# Patient Record
Sex: FEMALE | Race: WHITE | NOT HISPANIC OR LATINO | Employment: OTHER | URBAN - METROPOLITAN AREA
[De-identification: names, ages, dates, MRNs, and addresses within clinical notes are randomized per-mention and may not be internally consistent; named-entity substitution may affect disease eponyms.]

---

## 2022-01-01 ENCOUNTER — APPOINTMENT (INPATIENT)
Dept: CT IMAGING | Facility: HOSPITAL | Age: 87
End: 2022-01-01

## 2022-01-01 ENCOUNTER — APPOINTMENT (INPATIENT)
Dept: NON INVASIVE DIAGNOSTICS | Facility: HOSPITAL | Age: 87
End: 2022-01-01

## 2022-01-01 ENCOUNTER — APPOINTMENT (EMERGENCY)
Dept: RADIOLOGY | Facility: HOSPITAL | Age: 87
End: 2022-01-01

## 2022-01-01 ENCOUNTER — EPISODE CHANGES (OUTPATIENT)
Dept: CASE MANAGEMENT | Facility: OTHER | Age: 87
End: 2022-01-01

## 2022-01-01 ENCOUNTER — HOSPITAL ENCOUNTER (INPATIENT)
Facility: HOSPITAL | Age: 87
LOS: 2 days | End: 2022-12-01
Attending: EMERGENCY MEDICINE | Admitting: INTERNAL MEDICINE

## 2022-01-01 VITALS
BODY MASS INDEX: 33.31 KG/M2 | DIASTOLIC BLOOD PRESSURE: 60 MMHG | OXYGEN SATURATION: 94 % | SYSTOLIC BLOOD PRESSURE: 147 MMHG | RESPIRATION RATE: 20 BRPM | WEIGHT: 181 LBS | HEART RATE: 98 BPM | HEIGHT: 62 IN | TEMPERATURE: 97.3 F

## 2022-01-01 DIAGNOSIS — R77.8 ELEVATED TROPONIN: Primary | ICD-10-CM

## 2022-01-01 DIAGNOSIS — D72.829 LEUKOCYTOSIS: ICD-10-CM

## 2022-01-01 DIAGNOSIS — R74.8 ELEVATED CK: ICD-10-CM

## 2022-01-01 DIAGNOSIS — R63.0 LOSS OF APPETITE: ICD-10-CM

## 2022-01-01 DIAGNOSIS — D72.9 NEUTROPHILIC LEUKOCYTOSIS: ICD-10-CM

## 2022-01-01 DIAGNOSIS — R94.31 PROLONGED Q-T INTERVAL ON ECG: ICD-10-CM

## 2022-01-01 DIAGNOSIS — I10 HYPERTENSION: ICD-10-CM

## 2022-01-01 DIAGNOSIS — R53.1 WEAKNESS: ICD-10-CM

## 2022-01-01 LAB
2HR DELTA HS TROPONIN: 497 NG/L
4HR DELTA HS TROPONIN: 710 NG/L
ALBUMIN SERPL BCP-MCNC: 2.8 G/DL (ref 3.5–5)
ALP SERPL-CCNC: 100 U/L (ref 46–116)
ALT SERPL W P-5'-P-CCNC: 31 U/L (ref 12–78)
ANION GAP SERPL CALCULATED.3IONS-SCNC: 13 MMOL/L (ref 4–13)
ANION GAP SERPL CALCULATED.3IONS-SCNC: 14 MMOL/L (ref 4–13)
ANION GAP SERPL CALCULATED.3IONS-SCNC: 15 MMOL/L (ref 4–13)
ANION GAP SERPL CALCULATED.3IONS-SCNC: 15 MMOL/L (ref 4–13)
AORTIC ROOT: 3.1 CM
APICAL FOUR CHAMBER EJECTION FRACTION: 25 %
APTT PPP: 24 SECONDS (ref 23–37)
APTT PPP: 33 SECONDS (ref 23–37)
APTT PPP: 33 SECONDS (ref 23–37)
APTT PPP: 36 SECONDS (ref 23–37)
APTT PPP: 65 SECONDS (ref 23–37)
APTT PPP: 69 SECONDS (ref 23–37)
APTT PPP: 97 SECONDS (ref 23–37)
ASCENDING AORTA: 3.2 CM
AST SERPL W P-5'-P-CCNC: 53 U/L (ref 5–45)
ATRIAL RATE: 75 BPM
ATRIAL RATE: 76 BPM
ATRIAL RATE: 78 BPM
BACTERIA UR QL AUTO: ABNORMAL /HPF
BASOPHILS # BLD AUTO: 0.07 THOUSANDS/ÂΜL (ref 0–0.1)
BASOPHILS # BLD MANUAL: 0 THOUSAND/UL (ref 0–0.1)
BASOPHILS # BLD MANUAL: 0 THOUSAND/UL (ref 0–0.1)
BASOPHILS NFR BLD AUTO: 0 % (ref 0–1)
BASOPHILS NFR MAR MANUAL: 0 % (ref 0–1)
BASOPHILS NFR MAR MANUAL: 0 % (ref 0–1)
BILIRUB SERPL-MCNC: 1.49 MG/DL (ref 0.2–1)
BILIRUB UR QL STRIP: NEGATIVE
BUN SERPL-MCNC: 55 MG/DL (ref 5–25)
BUN SERPL-MCNC: 58 MG/DL (ref 5–25)
BUN SERPL-MCNC: 58 MG/DL (ref 5–25)
BUN SERPL-MCNC: 61 MG/DL (ref 5–25)
BURR CELLS BLD QL SMEAR: PRESENT
BURR CELLS BLD QL SMEAR: PRESENT
CALCIUM ALBUM COR SERPL-MCNC: 9.8 MG/DL (ref 8.3–10.1)
CALCIUM SERPL-MCNC: 8 MG/DL (ref 8.3–10.1)
CALCIUM SERPL-MCNC: 8.3 MG/DL (ref 8.3–10.1)
CALCIUM SERPL-MCNC: 8.6 MG/DL (ref 8.3–10.1)
CALCIUM SERPL-MCNC: 8.8 MG/DL (ref 8.3–10.1)
CARDIAC TROPONIN I PNL SERPL HS: 1224 NG/L
CARDIAC TROPONIN I PNL SERPL HS: 1437 NG/L
CARDIAC TROPONIN I PNL SERPL HS: 727 NG/L
CARDIAC TROPONIN I PNL SERPL HS: 802 NG/L (ref 8–18)
CHLORIDE SERPL-SCNC: 101 MMOL/L (ref 96–108)
CHLORIDE SERPL-SCNC: 103 MMOL/L (ref 96–108)
CHLORIDE SERPL-SCNC: 108 MMOL/L (ref 96–108)
CHLORIDE SERPL-SCNC: 97 MMOL/L (ref 96–108)
CHOLEST SERPL-MCNC: 119 MG/DL
CLARITY UR: ABNORMAL
CO2 SERPL-SCNC: 20 MMOL/L (ref 21–32)
CO2 SERPL-SCNC: 20 MMOL/L (ref 21–32)
CO2 SERPL-SCNC: 23 MMOL/L (ref 21–32)
CO2 SERPL-SCNC: 24 MMOL/L (ref 21–32)
COLOR UR: YELLOW
CREAT SERPL-MCNC: 1.62 MG/DL (ref 0.6–1.3)
CREAT SERPL-MCNC: 1.71 MG/DL (ref 0.6–1.3)
CREAT SERPL-MCNC: 1.82 MG/DL (ref 0.6–1.3)
CREAT SERPL-MCNC: 1.99 MG/DL (ref 0.6–1.3)
E WAVE DECELERATION TIME: 186 MS
EOSINOPHIL # BLD AUTO: 0 THOUSAND/ÂΜL (ref 0–0.61)
EOSINOPHIL # BLD MANUAL: 0 THOUSAND/UL (ref 0–0.4)
EOSINOPHIL # BLD MANUAL: 0 THOUSAND/UL (ref 0–0.4)
EOSINOPHIL NFR BLD AUTO: 0 % (ref 0–6)
EOSINOPHIL NFR BLD MANUAL: 0 % (ref 0–6)
EOSINOPHIL NFR BLD MANUAL: 0 % (ref 0–6)
ERYTHROCYTE [DISTWIDTH] IN BLOOD BY AUTOMATED COUNT: 13.9 % (ref 11.6–15.1)
ERYTHROCYTE [DISTWIDTH] IN BLOOD BY AUTOMATED COUNT: 14 % (ref 11.6–15.1)
ERYTHROCYTE [DISTWIDTH] IN BLOOD BY AUTOMATED COUNT: 14.2 % (ref 11.6–15.1)
ERYTHROCYTE [DISTWIDTH] IN BLOOD BY AUTOMATED COUNT: 14.3 % (ref 11.6–15.1)
ERYTHROCYTE [DISTWIDTH] IN BLOOD BY AUTOMATED COUNT: 14.5 % (ref 11.6–15.1)
ERYTHROCYTE [DISTWIDTH] IN BLOOD BY AUTOMATED COUNT: 14.6 % (ref 11.6–15.1)
FINE GRAN CASTS URNS QL MICRO: ABNORMAL /LPF
FLUAV RNA RESP QL NAA+PROBE: NEGATIVE
FLUBV RNA RESP QL NAA+PROBE: NEGATIVE
FRACTIONAL SHORTENING: 24 (ref 28–44)
GFR SERPL CREATININE-BSD FRML MDRD: 21 ML/MIN/1.73SQ M
GFR SERPL CREATININE-BSD FRML MDRD: 24 ML/MIN/1.73SQ M
GFR SERPL CREATININE-BSD FRML MDRD: 25 ML/MIN/1.73SQ M
GFR SERPL CREATININE-BSD FRML MDRD: 27 ML/MIN/1.73SQ M
GLUCOSE P FAST SERPL-MCNC: 231 MG/DL (ref 65–99)
GLUCOSE SERPL-MCNC: 231 MG/DL (ref 65–140)
GLUCOSE SERPL-MCNC: 260 MG/DL (ref 65–140)
GLUCOSE SERPL-MCNC: 260 MG/DL (ref 65–140)
GLUCOSE SERPL-MCNC: 267 MG/DL (ref 65–140)
GLUCOSE SERPL-MCNC: 313 MG/DL (ref 65–140)
GLUCOSE UR STRIP-MCNC: NEGATIVE MG/DL
HCT VFR BLD AUTO: 39.2 % (ref 34.8–46.1)
HCT VFR BLD AUTO: 41 % (ref 34.8–46.1)
HCT VFR BLD AUTO: 41 % (ref 34.8–46.1)
HCT VFR BLD AUTO: 41.2 % (ref 34.8–46.1)
HCT VFR BLD AUTO: 43.2 % (ref 34.8–46.1)
HCT VFR BLD AUTO: 44 % (ref 34.8–46.1)
HDLC SERPL-MCNC: 29 MG/DL
HGB BLD-MCNC: 13.6 G/DL (ref 11.5–15.4)
HGB BLD-MCNC: 13.7 G/DL (ref 11.5–15.4)
HGB BLD-MCNC: 13.7 G/DL (ref 11.5–15.4)
HGB BLD-MCNC: 13.9 G/DL (ref 11.5–15.4)
HGB BLD-MCNC: 14.5 G/DL (ref 11.5–15.4)
HGB BLD-MCNC: 14.7 G/DL (ref 11.5–15.4)
HGB UR QL STRIP.AUTO: ABNORMAL
IMM GRANULOCYTES # BLD AUTO: 0.26 THOUSAND/UL (ref 0–0.2)
IMM GRANULOCYTES NFR BLD AUTO: 1 % (ref 0–2)
INR PPP: 1.33 (ref 0.84–1.19)
INR PPP: 1.48 (ref 0.84–1.19)
INTERVENTRICULAR SEPTUM IN DIASTOLE (PARASTERNAL SHORT AXIS VIEW): 1.2 CM
INTERVENTRICULAR SEPTUM: 1.2 CM (ref 0.6–1.1)
KETONES UR STRIP-MCNC: ABNORMAL MG/DL
LAAS-AP2: 23.7 CM2
LAAS-AP4: 24.1 CM2
LDLC SERPL CALC-MCNC: 63 MG/DL (ref 0–100)
LEFT ATRIUM SIZE: 4.9 CM
LEFT INTERNAL DIMENSION IN SYSTOLE: 3.8 CM (ref 2.1–4)
LEFT VENTRICLE DIASTOLIC VOLUME (MOD BIPLANE): 82 ML
LEFT VENTRICLE SYSTOLIC VOLUME (MOD BIPLANE): 65 ML
LEFT VENTRICULAR INTERNAL DIMENSION IN DIASTOLE: 5 CM (ref 3.5–6)
LEFT VENTRICULAR POSTERIOR WALL IN END DIASTOLE: 1.1 CM
LEFT VENTRICULAR STROKE VOLUME: 59 ML
LEUKOCYTE ESTERASE UR QL STRIP: NEGATIVE
LV EF: 21 %
LVSV (TEICH): 59 ML
LYMPHOCYTES # BLD AUTO: 0.32 THOUSAND/UL (ref 0.6–4.47)
LYMPHOCYTES # BLD AUTO: 0.53 THOUSANDS/ÂΜL (ref 0.6–4.47)
LYMPHOCYTES # BLD AUTO: 1 % (ref 14–44)
LYMPHOCYTES # BLD AUTO: 1.14 THOUSAND/UL (ref 0.6–4.47)
LYMPHOCYTES # BLD AUTO: 4 % (ref 14–44)
LYMPHOCYTES NFR BLD AUTO: 2 % (ref 14–44)
MAGNESIUM SERPL-MCNC: 1.4 MG/DL (ref 1.6–2.6)
MAGNESIUM SERPL-MCNC: 1.7 MG/DL (ref 1.6–2.6)
MCH RBC QN AUTO: 30.9 PG (ref 26.8–34.3)
MCH RBC QN AUTO: 31 PG (ref 26.8–34.3)
MCH RBC QN AUTO: 31.1 PG (ref 26.8–34.3)
MCH RBC QN AUTO: 31.2 PG (ref 26.8–34.3)
MCH RBC QN AUTO: 31.5 PG (ref 26.8–34.3)
MCH RBC QN AUTO: 31.7 PG (ref 26.8–34.3)
MCHC RBC AUTO-ENTMCNC: 33.4 G/DL (ref 31.4–37.4)
MCHC RBC AUTO-ENTMCNC: 33.6 G/DL (ref 31.4–37.4)
MCHC RBC AUTO-ENTMCNC: 33.7 G/DL (ref 31.4–37.4)
MCHC RBC AUTO-ENTMCNC: 34.7 G/DL (ref 31.4–37.4)
MCV RBC AUTO: 91 FL (ref 82–98)
MCV RBC AUTO: 92 FL (ref 82–98)
MCV RBC AUTO: 93 FL (ref 82–98)
MONOCYTES # BLD AUTO: 1.14 THOUSAND/UL (ref 0–1.22)
MONOCYTES # BLD AUTO: 2.04 THOUSAND/ÂΜL (ref 0.17–1.22)
MONOCYTES # BLD AUTO: 2.23 THOUSAND/UL (ref 0–1.22)
MONOCYTES NFR BLD AUTO: 7 % (ref 4–12)
MONOCYTES NFR BLD: 4 % (ref 4–12)
MONOCYTES NFR BLD: 7 % (ref 4–12)
MRSA NOSE QL CULT: NORMAL
MV E'TISSUE VEL-SEP: 3 CM/S
MV PEAK A VEL: 1.02 M/S
MV PEAK E VEL: 94 CM/S
MV STENOSIS PRESSURE HALF TIME: 54 MS
MV VALVE AREA P 1/2 METHOD: 4.07
NEUTROPHILS # BLD AUTO: 25.99 THOUSANDS/ÂΜL (ref 1.85–7.62)
NEUTROPHILS # BLD MANUAL: 26.21 THOUSAND/UL (ref 1.85–7.62)
NEUTROPHILS # BLD MANUAL: 29.28 THOUSAND/UL (ref 1.85–7.62)
NEUTS BAND NFR BLD MANUAL: 23 % (ref 0–8)
NEUTS BAND NFR BLD MANUAL: 6 % (ref 0–8)
NEUTS SEG NFR BLD AUTO: 69 % (ref 43–75)
NEUTS SEG NFR BLD AUTO: 86 % (ref 43–75)
NEUTS SEG NFR BLD AUTO: 90 % (ref 43–75)
NITRITE UR QL STRIP: POSITIVE
NON-SQ EPI CELLS URNS QL MICRO: ABNORMAL /HPF
NONHDLC SERPL-MCNC: 90 MG/DL
NRBC BLD AUTO-RTO: 0 /100 WBCS
NT-PROBNP SERPL-MCNC: ABNORMAL PG/ML
P AXIS: 29 DEGREES
P AXIS: 43 DEGREES
P AXIS: 85 DEGREES
PH UR STRIP.AUTO: 6 [PH]
PLATELET # BLD AUTO: 193 THOUSANDS/UL (ref 149–390)
PLATELET # BLD AUTO: 218 THOUSANDS/UL (ref 149–390)
PLATELET # BLD AUTO: 226 THOUSANDS/UL (ref 149–390)
PLATELET # BLD AUTO: 233 THOUSANDS/UL (ref 149–390)
PLATELET # BLD AUTO: 246 THOUSANDS/UL (ref 149–390)
PLATELET # BLD AUTO: 262 THOUSANDS/UL (ref 149–390)
PLATELET BLD QL SMEAR: ADEQUATE
PLATELET BLD QL SMEAR: ADEQUATE
PMV BLD AUTO: 10.1 FL (ref 8.9–12.7)
PMV BLD AUTO: 10.5 FL (ref 8.9–12.7)
PMV BLD AUTO: 10.6 FL (ref 8.9–12.7)
PMV BLD AUTO: 10.8 FL (ref 8.9–12.7)
POIKILOCYTOSIS BLD QL SMEAR: PRESENT
POLYCHROMASIA BLD QL SMEAR: PRESENT
POTASSIUM SERPL-SCNC: 2.2 MMOL/L (ref 3.5–5.3)
POTASSIUM SERPL-SCNC: 3 MMOL/L (ref 3.5–5.3)
POTASSIUM SERPL-SCNC: 3 MMOL/L (ref 3.5–5.3)
POTASSIUM SERPL-SCNC: 3.1 MMOL/L (ref 3.5–5.3)
POTASSIUM SERPL-SCNC: 3.8 MMOL/L (ref 3.5–5.3)
PR INTERVAL: 158 MS
PR INTERVAL: 168 MS
PR INTERVAL: 168 MS
PROT SERPL-MCNC: 7.5 G/DL (ref 6.4–8.4)
PROT UR STRIP-MCNC: ABNORMAL MG/DL
PROTHROMBIN TIME: 16.4 SECONDS (ref 11.6–14.5)
PROTHROMBIN TIME: 17.8 SECONDS (ref 11.6–14.5)
QRS AXIS: -18 DEGREES
QRS AXIS: -31 DEGREES
QRS AXIS: 8 DEGREES
QRSD INTERVAL: 100 MS
QRSD INTERVAL: 116 MS
QRSD INTERVAL: 120 MS
QT INTERVAL: 384 MS
QT INTERVAL: 508 MS
QT INTERVAL: 562 MS
QTC INTERVAL: 432 MS
QTC INTERVAL: 579 MS
QTC INTERVAL: 627 MS
RA PRESSURE ESTIMATED: 3 MMHG
RBC # BLD AUTO: 4.29 MILLION/UL (ref 3.81–5.12)
RBC # BLD AUTO: 4.41 MILLION/UL (ref 3.81–5.12)
RBC # BLD AUTO: 4.42 MILLION/UL (ref 3.81–5.12)
RBC # BLD AUTO: 4.44 MILLION/UL (ref 3.81–5.12)
RBC # BLD AUTO: 4.66 MILLION/UL (ref 3.81–5.12)
RBC # BLD AUTO: 4.71 MILLION/UL (ref 3.81–5.12)
RBC #/AREA URNS AUTO: ABNORMAL /HPF
RBC MORPH BLD: PRESENT
RIGHT ATRIAL 2D VOLUME: 30 ML
RIGHT ATRIUM AREA SYSTOLE A4C: 12.5 CM2
RIGHT VENTRICLE ID DIMENSION: 3.9 CM
RV PSP: 34 MMHG
SARS-COV-2 RNA RESP QL NAA+PROBE: NEGATIVE
SARS-COV-2 RNA RESP QL NAA+PROBE: NEGATIVE
SL CV LEFT ATRIUM LENGTH A2C: 5.8 CM
SL CV LV EF: 21
SL CV PED ECHO LEFT VENTRICLE DIASTOLIC VOLUME (MOD BIPLANE) 2D: 121 ML
SL CV PED ECHO LEFT VENTRICLE SYSTOLIC VOLUME (MOD BIPLANE) 2D: 62 ML
SODIUM SERPL-SCNC: 134 MMOL/L (ref 135–147)
SODIUM SERPL-SCNC: 138 MMOL/L (ref 135–147)
SODIUM SERPL-SCNC: 139 MMOL/L (ref 135–147)
SODIUM SERPL-SCNC: 142 MMOL/L (ref 135–147)
SP GR UR STRIP.AUTO: 1.02 (ref 1–1.03)
T WAVE AXIS: 116 DEGREES
T WAVE AXIS: 118 DEGREES
T WAVE AXIS: 82 DEGREES
TR MAX PG: 31 MMHG
TR PEAK VELOCITY: 2.8 M/S
TRICUSPID VALVE PEAK REGURGITATION VELOCITY: 2.8 M/S
TRIGL SERPL-MCNC: 135 MG/DL
UROBILINOGEN UR QL STRIP.AUTO: 0.2 E.U./DL
VENTRICULAR RATE: 75 BPM
VENTRICULAR RATE: 76 BPM
VENTRICULAR RATE: 78 BPM
WBC # BLD AUTO: 28.28 THOUSAND/UL (ref 4.31–10.16)
WBC # BLD AUTO: 28.45 THOUSAND/UL (ref 4.31–10.16)
WBC # BLD AUTO: 28.49 THOUSAND/UL (ref 4.31–10.16)
WBC # BLD AUTO: 28.89 THOUSAND/UL (ref 4.31–10.16)
WBC # BLD AUTO: 31.83 THOUSAND/UL (ref 4.31–10.16)
WBC # BLD AUTO: 31.94 THOUSAND/UL (ref 4.31–10.16)
WBC #/AREA URNS AUTO: ABNORMAL /HPF

## 2022-01-01 PROCEDURE — 5A12012 PERFORMANCE OF CARDIAC OUTPUT, SINGLE, MANUAL: ICD-10-PCS | Performed by: INTERNAL MEDICINE

## 2022-01-01 RX ORDER — NETARSUDIL 0.2 MG/ML
1 SOLUTION/ DROPS OPHTHALMIC; TOPICAL
COMMUNITY

## 2022-01-01 RX ORDER — EZETIMIBE 10 MG/1
10 TABLET ORAL DAILY
Status: DISCONTINUED | OUTPATIENT
Start: 2022-01-01 | End: 2022-01-01 | Stop reason: HOSPADM

## 2022-01-01 RX ORDER — SENNOSIDES 8.6 MG
1 TABLET ORAL 2 TIMES DAILY
Status: DISCONTINUED | OUTPATIENT
Start: 2022-01-01 | End: 2022-01-01

## 2022-01-01 RX ORDER — ENOXAPARIN SODIUM 100 MG/ML
40 INJECTION SUBCUTANEOUS DAILY
Status: DISCONTINUED | OUTPATIENT
Start: 2022-01-01 | End: 2022-01-01

## 2022-01-01 RX ORDER — MAGNESIUM SULFATE HEPTAHYDRATE 40 MG/ML
2 INJECTION, SOLUTION INTRAVENOUS ONCE
Status: COMPLETED | OUTPATIENT
Start: 2022-01-01 | End: 2022-01-01

## 2022-01-01 RX ORDER — EZETIMIBE 10 MG/1
10 TABLET ORAL DAILY
COMMUNITY

## 2022-01-01 RX ORDER — EPINEPHRINE 0.1 MG/ML
SYRINGE (ML) INJECTION CODE/TRAUMA/SEDATION MEDICATION
Status: COMPLETED | OUTPATIENT
Start: 2022-01-01 | End: 2022-01-01

## 2022-01-01 RX ORDER — DOCUSATE SODIUM 100 MG/1
100 CAPSULE, LIQUID FILLED ORAL 2 TIMES DAILY
Status: DISCONTINUED | OUTPATIENT
Start: 2022-01-01 | End: 2022-01-01

## 2022-01-01 RX ORDER — HEPARIN SODIUM 1000 [USP'U]/ML
2000 INJECTION, SOLUTION INTRAVENOUS; SUBCUTANEOUS
Status: DISCONTINUED | OUTPATIENT
Start: 2022-01-01 | End: 2022-01-01

## 2022-01-01 RX ORDER — ATORVASTATIN CALCIUM 40 MG/1
40 TABLET, FILM COATED ORAL
Status: DISCONTINUED | OUTPATIENT
Start: 2022-01-01 | End: 2022-01-01 | Stop reason: HOSPADM

## 2022-01-01 RX ORDER — POTASSIUM CHLORIDE 20 MEQ/1
40 TABLET, EXTENDED RELEASE ORAL 2 TIMES DAILY
Status: DISCONTINUED | OUTPATIENT
Start: 2022-01-01 | End: 2022-01-01 | Stop reason: HOSPADM

## 2022-01-01 RX ORDER — ASPIRIN 81 MG/1
324 TABLET, CHEWABLE ORAL ONCE
Status: COMPLETED | OUTPATIENT
Start: 2022-01-01 | End: 2022-01-01

## 2022-01-01 RX ORDER — LOSARTAN POTASSIUM 25 MG/1
25 TABLET ORAL DAILY
Status: DISCONTINUED | OUTPATIENT
Start: 2022-01-01 | End: 2022-01-01

## 2022-01-01 RX ORDER — NITROGLYCERIN 0.4 MG/1
0.4 TABLET SUBLINGUAL
Status: DISCONTINUED | OUTPATIENT
Start: 2022-01-01 | End: 2022-01-01 | Stop reason: HOSPADM

## 2022-01-01 RX ORDER — ASPIRIN 81 MG/1
81 TABLET, CHEWABLE ORAL DAILY
COMMUNITY

## 2022-01-01 RX ORDER — POTASSIUM CHLORIDE 20 MEQ/1
40 TABLET, EXTENDED RELEASE ORAL 2 TIMES DAILY
Status: COMPLETED | OUTPATIENT
Start: 2022-01-01 | End: 2022-01-01

## 2022-01-01 RX ORDER — POTASSIUM CHLORIDE 14.9 MG/ML
20 INJECTION INTRAVENOUS ONCE
Status: COMPLETED | OUTPATIENT
Start: 2022-01-01 | End: 2022-01-01

## 2022-01-01 RX ORDER — SODIUM CHLORIDE AND POTASSIUM CHLORIDE 150; 900 MG/100ML; MG/100ML
50 INJECTION, SOLUTION INTRAVENOUS CONTINUOUS
Status: DISCONTINUED | OUTPATIENT
Start: 2022-01-01 | End: 2022-01-01

## 2022-01-01 RX ORDER — HYDROCHLOROTHIAZIDE 12.5 MG/1
12.5 CAPSULE, GELATIN COATED ORAL DAILY
COMMUNITY

## 2022-01-01 RX ORDER — HEPARIN SODIUM 1000 [USP'U]/ML
4000 INJECTION, SOLUTION INTRAVENOUS; SUBCUTANEOUS
Status: DISCONTINUED | OUTPATIENT
Start: 2022-01-01 | End: 2022-01-01

## 2022-01-01 RX ORDER — METRONIDAZOLE 500 MG/100ML
500 INJECTION, SOLUTION INTRAVENOUS EVERY 8 HOURS
Status: DISCONTINUED | OUTPATIENT
Start: 2022-01-01 | End: 2022-01-01

## 2022-01-01 RX ORDER — HEPARIN SODIUM 1000 [USP'U]/ML
4000 INJECTION, SOLUTION INTRAVENOUS; SUBCUTANEOUS
Status: DISCONTINUED | OUTPATIENT
Start: 2022-01-01 | End: 2022-01-01 | Stop reason: HOSPADM

## 2022-01-01 RX ORDER — LOSARTAN POTASSIUM 100 MG/1
25 TABLET ORAL DAILY
COMMUNITY

## 2022-01-01 RX ORDER — ONDANSETRON 2 MG/ML
4 INJECTION INTRAMUSCULAR; INTRAVENOUS EVERY 6 HOURS PRN
Status: DISCONTINUED | OUTPATIENT
Start: 2022-01-01 | End: 2022-01-01 | Stop reason: HOSPADM

## 2022-01-01 RX ORDER — HEPARIN SODIUM 1000 [USP'U]/ML
4000 INJECTION, SOLUTION INTRAVENOUS; SUBCUTANEOUS ONCE
Status: COMPLETED | OUTPATIENT
Start: 2022-01-01 | End: 2022-01-01

## 2022-01-01 RX ORDER — ASPIRIN 81 MG/1
81 TABLET, CHEWABLE ORAL DAILY
Status: DISCONTINUED | OUTPATIENT
Start: 2022-01-01 | End: 2022-01-01 | Stop reason: HOSPADM

## 2022-01-01 RX ORDER — HEPARIN SODIUM 10000 [USP'U]/100ML
3-20 INJECTION, SOLUTION INTRAVENOUS
Status: DISCONTINUED | OUTPATIENT
Start: 2022-01-01 | End: 2022-01-01 | Stop reason: HOSPADM

## 2022-01-01 RX ORDER — FUROSEMIDE 10 MG/ML
40 INJECTION INTRAMUSCULAR; INTRAVENOUS ONCE
Status: COMPLETED | OUTPATIENT
Start: 2022-01-01 | End: 2022-01-01

## 2022-01-01 RX ORDER — TIMOLOL MALEATE 5 MG/ML
1 SOLUTION/ DROPS OPHTHALMIC 2 TIMES DAILY
Status: DISCONTINUED | OUTPATIENT
Start: 2022-01-01 | End: 2022-01-01 | Stop reason: HOSPADM

## 2022-01-01 RX ORDER — HYDROCHLOROTHIAZIDE 12.5 MG/1
12.5 TABLET ORAL DAILY
Status: DISCONTINUED | OUTPATIENT
Start: 2022-01-01 | End: 2022-01-01

## 2022-01-01 RX ORDER — HEPARIN SODIUM 1000 [USP'U]/ML
2000 INJECTION, SOLUTION INTRAVENOUS; SUBCUTANEOUS
Status: DISCONTINUED | OUTPATIENT
Start: 2022-01-01 | End: 2022-01-01 | Stop reason: HOSPADM

## 2022-01-01 RX ORDER — METRONIDAZOLE 500 MG/1
500 TABLET ORAL EVERY 8 HOURS SCHEDULED
Status: DISCONTINUED | OUTPATIENT
Start: 2022-01-01 | End: 2022-01-01

## 2022-01-01 RX ORDER — HEPARIN SODIUM 10000 [USP'U]/100ML
3-20 INJECTION, SOLUTION INTRAVENOUS
Status: DISCONTINUED | OUTPATIENT
Start: 2022-01-01 | End: 2022-01-01

## 2022-01-01 RX ADMIN — EZETIMIBE 10 MG: 10 TABLET ORAL at 09:25

## 2022-01-01 RX ADMIN — SENNOSIDES 8.6 MG: 8.6 TABLET, FILM COATED ORAL at 17:12

## 2022-01-01 RX ADMIN — METOPROLOL TARTRATE 25 MG: 25 TABLET, FILM COATED ORAL at 09:25

## 2022-01-01 RX ADMIN — SENNOSIDES 8.6 MG: 8.6 TABLET, FILM COATED ORAL at 09:25

## 2022-01-01 RX ADMIN — ATORVASTATIN CALCIUM 40 MG: 40 TABLET, FILM COATED ORAL at 17:20

## 2022-01-01 RX ADMIN — HEPARIN SODIUM 4000 UNITS: 1000 INJECTION INTRAVENOUS; SUBCUTANEOUS at 13:21

## 2022-01-01 RX ADMIN — METRONIDAZOLE 500 MG: 500 INJECTION, SOLUTION INTRAVENOUS at 10:33

## 2022-01-01 RX ADMIN — HEPARIN SODIUM 4000 UNITS: 1000 INJECTION INTRAVENOUS; SUBCUTANEOUS at 23:50

## 2022-01-01 RX ADMIN — ASPIRIN 81 MG CHEWABLE TABLET 81 MG: 81 TABLET CHEWABLE at 09:25

## 2022-01-01 RX ADMIN — HEPARIN SODIUM 4000 UNITS: 1000 INJECTION INTRAVENOUS; SUBCUTANEOUS at 22:12

## 2022-01-01 RX ADMIN — METOPROLOL TARTRATE 25 MG: 25 TABLET, FILM COATED ORAL at 23:50

## 2022-01-01 RX ADMIN — ASPIRIN 81 MG CHEWABLE TABLET 324 MG: 81 TABLET CHEWABLE at 14:59

## 2022-01-01 RX ADMIN — HEPARIN SODIUM 4000 UNITS: 1000 INJECTION INTRAVENOUS; SUBCUTANEOUS at 03:21

## 2022-01-01 RX ADMIN — EPINEPHRINE 1 MG: 0.1 INJECTION, SOLUTION ENDOTRACHEAL; INTRACARDIAC; INTRAVENOUS at 10:49

## 2022-01-01 RX ADMIN — FUROSEMIDE 40 MG: 10 INJECTION, SOLUTION INTRAMUSCULAR; INTRAVENOUS at 08:39

## 2022-01-01 RX ADMIN — TIMOLOL MALEATE 1 DROP: 5 SOLUTION/ DROPS OPHTHALMIC at 09:28

## 2022-01-01 RX ADMIN — SODIUM CHLORIDE AND POTASSIUM CHLORIDE 50 ML/HR: .9; .15 SOLUTION INTRAVENOUS at 09:34

## 2022-01-01 RX ADMIN — METOPROLOL TARTRATE 25 MG: 25 TABLET, FILM COATED ORAL at 08:35

## 2022-01-01 RX ADMIN — TIMOLOL MALEATE 1 DROP: 5 SOLUTION/ DROPS OPHTHALMIC at 09:49

## 2022-01-01 RX ADMIN — SODIUM CHLORIDE AND POTASSIUM CHLORIDE 50 ML/HR: .9; .15 SOLUTION INTRAVENOUS at 06:44

## 2022-01-01 RX ADMIN — POTASSIUM CHLORIDE 40 MEQ: 1500 TABLET, EXTENDED RELEASE ORAL at 23:50

## 2022-01-01 RX ADMIN — EZETIMIBE 10 MG: 10 TABLET ORAL at 09:34

## 2022-01-01 RX ADMIN — POTASSIUM CHLORIDE 40 MEQ: 1500 TABLET, EXTENDED RELEASE ORAL at 17:20

## 2022-01-01 RX ADMIN — SODIUM CHLORIDE, SODIUM LACTATE, POTASSIUM CHLORIDE, AND CALCIUM CHLORIDE 1000 ML: .6; .31; .03; .02 INJECTION, SOLUTION INTRAVENOUS at 15:00

## 2022-01-01 RX ADMIN — ASPIRIN 81 MG CHEWABLE TABLET 81 MG: 81 TABLET CHEWABLE at 08:35

## 2022-01-01 RX ADMIN — POTASSIUM CHLORIDE 40 MEQ: 1500 TABLET, EXTENDED RELEASE ORAL at 09:25

## 2022-01-01 RX ADMIN — MAGNESIUM SULFATE HEPTAHYDRATE 2 G: 40 INJECTION, SOLUTION INTRAVENOUS at 12:11

## 2022-01-01 RX ADMIN — POTASSIUM CHLORIDE 40 MEQ: 1500 TABLET, EXTENDED RELEASE ORAL at 17:12

## 2022-01-01 RX ADMIN — DOCUSATE SODIUM 100 MG: 100 CAPSULE, LIQUID FILLED ORAL at 09:25

## 2022-01-01 RX ADMIN — POTASSIUM CHLORIDE 40 MEQ: 1500 TABLET, EXTENDED RELEASE ORAL at 09:34

## 2022-01-01 RX ADMIN — CEFTRIAXONE SODIUM 1000 MG: 10 INJECTION, POWDER, FOR SOLUTION INTRAVENOUS at 10:28

## 2022-01-01 RX ADMIN — METOPROLOL TARTRATE 25 MG: 25 TABLET, FILM COATED ORAL at 20:31

## 2022-01-01 RX ADMIN — HEPARIN SODIUM 20 UNITS/KG/HR: 10000 INJECTION, SOLUTION INTRAVENOUS at 00:45

## 2022-01-01 RX ADMIN — ATORVASTATIN CALCIUM 40 MG: 40 TABLET, FILM COATED ORAL at 17:12

## 2022-01-01 RX ADMIN — DOCUSATE SODIUM 100 MG: 100 CAPSULE, LIQUID FILLED ORAL at 17:12

## 2022-01-01 RX ADMIN — POTASSIUM CHLORIDE 20 MEQ: 14.9 INJECTION, SOLUTION INTRAVENOUS at 09:35

## 2022-01-01 RX ADMIN — METOPROLOL TARTRATE 25 MG: 25 TABLET, FILM COATED ORAL at 09:34

## 2022-01-01 RX ADMIN — TIMOLOL MALEATE 1 DROP: 5 SOLUTION/ DROPS OPHTHALMIC at 20:17

## 2022-01-01 RX ADMIN — TIMOLOL MALEATE 1 DROP: 5 SOLUTION/ DROPS OPHTHALMIC at 08:39

## 2022-01-01 RX ADMIN — ASPIRIN 81 MG CHEWABLE TABLET 81 MG: 81 TABLET CHEWABLE at 09:34

## 2022-01-01 RX ADMIN — POTASSIUM CHLORIDE 40 MEQ: 1500 TABLET, EXTENDED RELEASE ORAL at 08:35

## 2022-01-01 RX ADMIN — HEPARIN SODIUM 12 UNITS/KG/HR: 10000 INJECTION, SOLUTION INTRAVENOUS at 18:48

## 2022-01-01 RX ADMIN — METOPROLOL TARTRATE 25 MG: 25 TABLET, FILM COATED ORAL at 20:17

## 2022-01-01 RX ADMIN — SODIUM CHLORIDE AND POTASSIUM CHLORIDE 50 ML/HR: .9; .15 SOLUTION INTRAVENOUS at 06:34

## 2022-01-01 RX ADMIN — EZETIMIBE 10 MG: 10 TABLET ORAL at 08:35

## 2022-01-01 RX ADMIN — TIMOLOL MALEATE 1 DROP: 5 SOLUTION/ DROPS OPHTHALMIC at 20:31

## 2022-01-01 RX ADMIN — PERFLUTREN 0.6 ML/MIN: 6.52 INJECTION, SUSPENSION INTRAVENOUS at 11:36

## 2022-01-01 RX ADMIN — HEPARIN SODIUM 12 UNITS/KG/HR: 10000 INJECTION, SOLUTION INTRAVENOUS at 23:31

## 2022-11-28 PROBLEM — N18.9 CKD (CHRONIC KIDNEY DISEASE): Status: ACTIVE | Noted: 2022-01-01

## 2022-11-28 PROBLEM — R77.8 ELEVATED TROPONIN: Status: ACTIVE | Noted: 2022-01-01

## 2022-11-28 PROBLEM — E87.6 HYPOKALEMIA: Status: ACTIVE | Noted: 2022-01-01

## 2022-11-28 PROBLEM — E11.9 DM2 (DIABETES MELLITUS, TYPE 2) (HCC): Status: ACTIVE | Noted: 2022-01-01

## 2022-11-28 PROBLEM — I10 HTN (HYPERTENSION): Status: ACTIVE | Noted: 2022-01-01

## 2022-11-28 NOTE — ED PROVIDER NOTES
History  Chief Complaint   Patient presents with   • Medical Problem     Pt  Brought by EMS from [place] [id number]  EMS stated that the pts POA wanted pt  To be brought in and evaluated due to pt  Having a decreased appetite  EMS BG 29       5year-old female presents for evaluation of decreased appetite over the past several days which is unchanged without modifying factors  Patient states that she is unsure why she was here she denies chest pain, shortness of breath, cough, fevers, chills, abdominal pain, diarrhea, constipation      History provided by:  Patient  Medical Problem  Associated symptoms: fever    Associated symptoms: no abdominal pain, no chest pain, no congestion, no diarrhea, no ear pain, no fatigue, no myalgias, no nausea, no rash, no rhinorrhea, no shortness of breath, no sore throat, no vomiting and no wheezing        Prior to Admission Medications   Prescriptions Last Dose Informant Patient Reported? Taking? CLONIDINE HCL PO   Yes Yes   Sig: Take 0 1 mg by mouth   Cholecalciferol 50 MCG ( UT) CAPS   Yes No   Sig: Take 1 capsule by mouth   Netarsudil Dimesylate (Rhopressa) 0 02 % SOLN   Yes No   Sig: Apply 1 drop to eye   aspirin 81 mg chewable tablet   Yes Yes   Sig: Chew 81 mg daily   ezetimibe (ZETIA) 10 mg tablet   Yes Yes   Sig: Take 10 mg by mouth daily   hydrochlorothiazide (MICROZIDE) 12 5 mg capsule   Yes Yes   Sig: Take 12 5 mg by mouth daily   losartan (COZAAR) 100 MG tablet   Yes Yes   Sig: Take 25 mg by mouth daily   timolol (BETIMOL) 0 5 % ophthalmic solution   Yes Yes   Si drop 2 (two) times a day      Facility-Administered Medications: None       Past Medical History:   Diagnosis Date   • CKD (chronic kidney disease)    • Diabetes mellitus (HCC)    • Hyperlipidemia    • Hypertension        History reviewed  No pertinent surgical history  History reviewed  No pertinent family history    I have reviewed and agree with the history as documented  E-Cigarette/Vaping     E-Cigarette/Vaping Substances     Social History     Tobacco Use   • Smoking status: Never   • Smokeless tobacco: Never   Substance Use Topics   • Alcohol use: Never   • Drug use: Never       Review of Systems   Constitutional: Positive for fever  Negative for activity change, appetite change and fatigue  HENT: Negative for congestion, dental problem, ear pain, rhinorrhea and sore throat  Eyes: Negative for pain and redness  Respiratory: Negative for chest tightness, shortness of breath and wheezing  Cardiovascular: Negative for chest pain and palpitations  Gastrointestinal: Negative for abdominal pain, blood in stool, constipation, diarrhea, nausea and vomiting  Endocrine: Negative for cold intolerance and heat intolerance  Genitourinary: Negative for dysuria, frequency and hematuria  Musculoskeletal: Negative for arthralgias and myalgias  Skin: Negative for color change, pallor and rash  Neurological: Positive for weakness  Negative for numbness  Hematological: Does not bruise/bleed easily  Psychiatric/Behavioral: Negative for agitation, hallucinations and suicidal ideas  Physical Exam  Physical Exam  Vitals and nursing note reviewed  Constitutional:       Appearance: She is well-developed and well-nourished  HENT:      Mouth/Throat:      Pharynx: No oropharyngeal exudate  Comments: TMs normal bilaterally no pharyngeal erythema no rhinorrhea nontender palpation of sinuses, normal looking turbinatesEyes:      Extraocular Movements: EOM normal       Conjunctiva/sclera: Conjunctivae normal    Neck:      Comments: No meningeal signs  Cardiovascular:      Rate and Rhythm: Normal rate and regular rhythm  Pulses: Intact distal pulses  Heart sounds: Normal heart sounds  Pulmonary:      Effort: Pulmonary effort is normal  No respiratory distress  Breath sounds: Normal breath sounds  No wheezing or rales     Chest:      Chest wall: No tenderness  Abdominal:      General: Bowel sounds are normal  There is no distension  Palpations: Abdomen is soft  There is no mass  Tenderness: There is no abdominal tenderness  Hernia: No hernia is present  Comments: No cvat   Musculoskeletal:         General: No edema  Normal range of motion  Cervical back: Normal range of motion and neck supple  Lymphadenopathy:      Cervical: No cervical adenopathy  Skin:     Findings: No erythema or rash  Comments: No edema   Neurological:      Mental Status: She is alert and oriented to person, place, and time  Cranial Nerves: No cranial nerve deficit  Psychiatric:         Mood and Affect: Mood and affect normal          Behavior: Behavior normal          Vital Signs  ED Triage Vitals [11/28/22 1338]   Temperature Pulse Respirations Blood Pressure SpO2   97 7 °F (36 5 °C) 80 18 (!) 185/81 96 %      Temp Source Heart Rate Source Patient Position - Orthostatic VS BP Location FiO2 (%)   Oral Monitor Lying Right arm --      Pain Score       No Pain           Vitals:    11/28/22 1338   BP: (!) 185/81   Pulse: 80   Patient Position - Orthostatic VS: Lying         Visual Acuity      ED Medications  Medications   lactated ringers bolus 1,000 mL (1,000 mL Intravenous New Bag 11/28/22 1500)   aspirin chewable tablet 324 mg (324 mg Oral Given 11/28/22 1459)       Diagnostic Studies  Results Reviewed     Procedure Component Value Units Date/Time    HS Troponin I 4hr [[id number]]     Lab Status: No result Specimen: Blood     Magnesium [[id number]] Updated: 11/28/22 6167    Lab Status: No result Specimen: Blood from Arm, Left     FLU/COVID - if FLU clinically relevant [[id number]] Collected: 11/28/22 1455    Lab Status:  In process Specimen: Nares from Nasopharyngeal Swab Updated: 11/28/22 1457    NT-BNP PRO [[id number]]  (Abnormal) Collected: 11/28/22 1401    Lab Status: Final result Specimen: Blood from Arm, Left Updated: 11/28/22 1455 NT-proBNP 19,814 pg/mL     HS Troponin 0hr (reflex protocol) [[id number]]  (Abnormal) Collected: 11/28/22 1401    Lab Status: Final result Specimen: Blood from Arm, Left Updated: 11/28/22 1429     hs TnI 0hr 727 ng/L     HS Troponin I 2hr [[id number]]     Lab Status: No result Specimen: Blood     Comprehensive metabolic panel [[id number]]  (Abnormal) Collected: 11/28/22 1401    Lab Status: Final result Specimen: Blood from Arm, Left Updated: 11/28/22 1425     Sodium 134 mmol/L      Potassium 3 0 mmol/L      Chloride 97 mmol/L      CO2 24 mmol/L      ANION GAP 13 mmol/L      BUN 61 mg/dL      Creatinine 1 99 mg/dL      Glucose 313 mg/dL      Calcium 8 8 mg/dL      Corrected Calcium 9 8 mg/dL      AST 53 U/L      ALT 31 U/L      Alkaline Phosphatase 100 U/L      Total Protein 7 5 g/dL      Albumin 2 8 g/dL      Total Bilirubin 1 49 mg/dL      eGFR 21 ml/min/1 73sq m     Narrative:      National Kid[employer] Foundation guidelines for Chronic Kidney Disease (CKD):   •  Stage 1 with normal or high GFR (GFR > 90 mL/min/1 73 square meters)  •  Stage 2 Mild CKD (GFR = 60-89 mL/min/1 73 square meters)  •  Stage 3A Moderate CKD (GFR = 45-59 mL/min/1 73 square meters)  •  Stage 3B Moderate CKD (GFR = 30-44 mL/min/1 73 square meters)  •  Stage 4 Severe CKD (GFR = 15-29 mL/min/1 73 square meters)  •  Stage 5 End Stage CKD (GFR <15 mL/min/1 73 square meters)  Note: GFR calculation is accurate only with a steady state creatinine    CBC and differential [[id number]]  (Abnormal) Collected: 11/28/22 1401    Lab Status: Final result Specimen: Blood from Arm, Left Updated: 11/28/22 1421     WBC 28 89 Thousand/uL      RBC 4 66 Million/uL      Hemoglobin 14 5 g/dL      Hematocrit 43 2 %      MCV 93 fL      MCH 31 1 pg      MCHC 33 6 g/dL      RDW 14 2 %      MPV 10 1 fL      Platelets 298 Thousands/uL      nRBC 0 /100 WBCs      Neutrophils Relative 90 %      Immat GRANS % 1 %      Lymphocytes Relative 2 %      Monocytes Relative 7 % Eosinophils Relative 0 %      Basophils Relative 0 %      Neutrophils Absolute 25 99 Thousands/µL      Immature Grans Absolute 0 26 Thousand/uL      Lymphocytes Absolute 0 53 Thousands/µL      Monocytes Absolute 2 04 Thousand/µL      Eosinophils Absolute 0 00 Thousand/µL      Basophils Absolute 0 07 Thousands/µL     Narrative: This is an appended report  These results have been appended to a previously verified report  XR chest 1 view portable   ED Interpretation by Dung Howell MD (11/28 0878)   Primary reviewed no acute abnormality                 Procedures  ECG 12 Lead Documentation Only    Date/Time: 11/28/2022 4:05 PM  Performed by: Dung Howell MD  Authorized by: Dung Howell MD     ECG reviewed by me, the ED Provider: yes    Patient location:  ED  Previous ECG:     Previous ECG:  Compared to current    Similarity:  No change  Rate:     ECG rate:  78    ECG rate assessment: normal    Rhythm:     Rhythm: sinus rhythm    Ectopy:     Ectopy: none    QRS:     QRS axis:  Normal    QRS intervals:  Normal  Conduction:     Conduction: normal    ST segments:     ST segments:  Normal  T waves:     T waves: non-specific    Other findings:     Other findings: prolonged qTc interval               ED Course  ED Course as of 11/28/22 1613   Mon Nov 28, 2022   1450 WBC(!): 28 89   1450 Creatinine(!): 1 99   1450 hs TnI 0hr(!): 727             HEART Risk Score    Flowsheet Row Most Recent Value   Heart Score Risk Calculator    History 0 Filed at: 11/28/2022 1613   ECG 1 Filed at: 11/28/2022 1613   Age 2 Filed at: 11/28/2022 1613   Risk Factors 2 Filed at: 11/28/2022 1613   Troponin 2 Filed at: 11/28/2022 1613   HEART Score 7 Filed at: 11/28/2022 1613                        SBIRT 20yo+    Flowsheet Row Most Recent Value   SBIRT (23 yo +)    In order to provide better care to our patients, we are screening all of our patients for alcohol and drug use   Would it be okay to ask you these screening questions? Unable to answer at this time Filed at: 11/28/2022 1424                    [facility]  Number of Diagnoses or Management Options  Diagnosis management comments: Apple Amaro do EKG/fibroid that will chest pain, abdominal labs reassess      Disposition  Final diagnoses:   Elevated troponin   Weakness   Loss of appetite   Leukocytosis   Hypertension   Prolonged Q-T interval on ECG   Elevated CK     Time reflects when diagnosis was documented in both MDM as applicable and the Disposition within this note     Time User Action Codes Description Comment    11/28/2022  4:06 PM Wyonia Comstock Add [R77 8] Elevated troponin     11/28/2022  4:06 PM Kelvin Phillips Add [R53 1] Weakness     11/28/2022  4:07 PM Diane Phillips Add [R63 0] Loss of appetite     11/28/2022  4:07 PM Wyonia Comstock Add [P81 913] Leukocytosis     11/28/2022  4:07 PM Kelvin Phillips Add [I10] Hypertension     11/28/2022  4:07 PM Wyonia Comstock Add [R94 31] Prolonged Q-T interval on ECG     11/28/2022  4:12 PM Wyonia Comstock Add [R74 8] Elevated CK       ED Disposition     ED Disposition   Admit    Condition   Stable    Date/Time   Mon Nov 28, 2022  4:06 PM    Comment   Case was discussed with Dr Leslie Woodard and the patient's admission status was agreed to be Admission Status: inpatient status to the service of Dr Leslie Woodard   Follow-up Information    None         Patient's Medications   Discharge Prescriptions    No medications on file       No discharge procedures on file      PDMP Review     None          ED Provider  Electronically Signed by           Tamir Crane MD  11/28/22 9488

## 2022-11-29 PROBLEM — D72.9 NEUTROPHILIC LEUKOCYTOSIS: Status: ACTIVE | Noted: 2022-01-01

## 2022-11-29 PROBLEM — R79.89 ELEVATED BRAIN NATRIURETIC PEPTIDE (BNP) LEVEL: Status: ACTIVE | Noted: 2022-01-01

## 2022-11-29 NOTE — PLAN OF CARE
Problem: MOBILITY - ADULT  Goal: Maintain or return to baseline ADL function  Description: INTERVENTIONS:  -  Assess patient's ability to carry out ADLs; assess patient's baseline for ADL function and identify physical deficits which impact ability to perform ADLs (bathing, care of mouth/teeth, toileting, grooming, dressing, etc )  - Assess/evaluate cause of self-care deficits   - Assess range of motion  - Assess patient's mobility; develop plan if impaired  - Assess patient's need for assistive devices and provide as appropriate  - Encourage maximum independence but intervene and supervise when necessary  - Involve family in performance of ADLs  - Assess for home care needs following discharge   - Consider OT consult to assist with ADL evaluation and planning for discharge  - Provide patient education as appropriate  Outcome: Progressing  Goal: Maintains/Returns to pre admission functional level  Description: INTERVENTIONS:  - Perform BMAT or MOVE assessment daily    - Set and communicate daily mobility goal to care team and patient/family/caregiver  - Collaborate with rehabilitation services on mobility goals if consulted  - Perform Range of Motion  times a day  - Reposition patient every 2 hours    - Dangle patient 3 times a day  - Stand patient 3 times a day  - Ambulate patient 3 times a day  - Out of bed to chair 3 times a day   - Out of bed for meals 3 times a day  - Out of bed for toileting  - Record patient progress and toleration of activity level   Outcome: Progressing     Problem: Potential for Falls  Goal: Patient will remain free of falls  Description: INTERVENTIONS:  - Educate patient/family on patient safety including physical limitations  - Instruct patient to call for assistance with activity   - Consult OT/PT to assist with strengthening/mobility   - Keep Call bell within reach  - Keep bed low and locked with side rails adjusted as appropriate  - Keep care items and personal belongings within reach  - Initiate and maintain comfort rounds  - Make Fall Risk Sign visible to staff  - Offer Toileting every 2 Hours, in advance of need  - Initiate/Maintain bed alarm  - Obtain necessary fall risk management equipment:   - Apply yellow socks and bracelet for high fall risk patients  - Consider moving patient to room near nurses station  Outcome: Progressing     Problem: PAIN - ADULT  Goal: Verbalizes/displays adequate comfort level or baseline comfort level  Description: Interventions:  - Encourage patient to monitor pain and request assistance  - Assess pain using appropriate pain scale  - Administer analgesics based on type and severity of pain and evaluate response  - Implement non-pharmacological measures as appropriate and evaluate response  - Consider cultural and social influences on pain and pain management  - Notify physician/advanced practitioner if interventions unsuccessful or patient reports new pain  Outcome: Progressing     Problem: INFECTION - ADULT  Goal: Absence or prevention of progression during hospitalization  Description: INTERVENTIONS:  - Assess and monitor for signs and symptoms of infection  - Monitor lab/diagnostic results  - Monitor all insertion sites, i e  indwelling lines, tubes, and drains  - Monitor endotracheal if appropriate and nasal secretions for changes in amount and color  - [place] appropriate cooling/warming therapies per order  - Administer medications as ordered  - Instruct and encourage patient and family to use good hand hygiene technique  - Identify and instruct in appropriate isolation precautions for identified infection/condition  Outcome: Progressing  Goal: Absence of fever/infection during neutropenic period  Description: INTERVENTIONS:  - Monitor WBC    Outcome: Progressing     Problem: SAFETY ADULT  Goal: Maintain or return to baseline ADL function  Description: INTERVENTIONS:  -  Assess patient's ability to carry out ADLs; assess patient's baseline for ADL function and identify physical deficits which impact ability to perform ADLs (bathing, care of mouth/teeth, toileting, grooming, dressing, etc )  - Assess/evaluate cause of self-care deficits   - Assess range of motion  - Assess patient's mobility; develop plan if impaired  - Assess patient's need for assistive devices and provide as appropriate  - Encourage maximum independence but intervene and supervise when necessary  - Involve family in performance of ADLs  - Assess for home care needs following discharge   - Consider OT consult to assist with ADL evaluation and planning for discharge  - Provide patient education as appropriate  Outcome: Progressing  Goal: Maintains/Returns to pre admission functional level  Description: INTERVENTIONS:  - Perform BMAT or MOVE assessment daily    - Set and communicate daily mobility goal to care team and patient/family/caregiver  - Collaborate with rehabilitation services on mobility goals if consulted  - Perform Range of Motion 3 times a day  - Reposition patient every 2 hours    - Dangle patient 3 times a day  - Stand patient 3 times a day  - Ambulate patient 3 times a day  - Out of bed to chair 3 times a day   - Out of bed for meals 3 times a day  - Out of bed for toileting  - Record patient progress and toleration of activity level   Outcome: Progressing  Goal: Patient will remain free of falls  Description: INTERVENTIONS:  - Educate patient/family on patient safety including physical limitations  - Instruct patient to call for assistance with activity   - Consult OT/PT to assist with strengthening/mobility   - Keep Call bell within reach  - Keep bed low and locked with side rails adjusted as appropriate  - Keep care items and personal belongings within reach  - Initiate and maintain comfort rounds  - Make Fall Risk Sign visible to staff  - Offer Toileting every 2 Hours, in advance of need  - Initiate/Maintain bed alarm  - Obtain necessary fall risk management equipment: - Apply yellow socks and bracelet for high fall risk patients  - Consider moving patient to room near nurses station  Outcome: Progressing     Problem: DISCHARGE PLANNING  Goal: Discharge to home or other facility with appropriate resources  Description: INTERVENTIONS:  - Identify barriers to discharge w/patient and caregiver  - Arrange for needed discharge resources and transportation as appropriate  - Identify discharge learning needs (meds, wound care, etc )  - Arrange for interpretive services to assist at discharge as needed  - Refer to Case Management Department for coordinating discharge planning if the patient needs post-hospital services based on physician/advanced practitioner order or complex needs related to functional status, cognitive ability, or social support system  Outcome: Progressing     Problem: Knowledge Deficit  Goal: Patient/family/caregiver demonstrates understanding of disease process, treatment plan, medications, and discharge instructions  Description: Complete learning assessment and assess knowledge base    Interventions:  - Provide teaching at level of understanding  - Provide teaching via preferred learning methods  Outcome: Progressing

## 2022-11-29 NOTE — PLAN OF CARE
Problem: OCCUPATIONAL THERAPY ADULT  Goal: Performs self-care activities at highest level of function for planned discharge setting  See evaluation for individualized goals  Description: Treatment Interventions: ADL retraining, Functional transfer training, UE strengthening/ROM, Endurance training, Patient/family training, Cognitive reorientation, Equipment evaluation/education, Compensatory technique education, Continued evaluation          See flowsheet documentation for full assessment, interventions and recommendations  Note: Limitation: Decreased ADL status, Decreased UE ROM, Decreased UE strength, Decreased Safe judgement during ADL, Decreased cognition, Decreased endurance, Decreased high-level ADLs  Prognosis: Fair  Assessment: Pt is a 81y/o female admitted to the hospital 2* decrease appetite  Pt noted with elevated troponins, elevated BNP, and hypokalemia  Pt with PMH CKD, DM, HTN, and dementia  PTA pt states independence with her ADLs, transfers, ambulation--with RW; neg falls, ? accuracy of statements 2* pt being a poor historian  During initial eval, pt demonstrated deficits with her functional balance, functional mobility, ADL status, transfer safety, b/l UE ROM/strength, activity tolerance(currently fair=15-20mins), and cognition(i e memory, orientation, problem-solving, and judgement/safety)  Pt would benefit from continued OT tx for the above deficits  2-3xwk/1-2wks  The patient's raw score on the AM-PAC Daily Activity inpatient short form is 16, standardized score is 35 96, less than 39 4  Patients at this level are likely to benefit from discharge to post-acute rehabilitation services  Please refer to the recommendation of the Occupational Therapist for safe discharge planning       OT Discharge Recommendation: Return to facility with rehabilitation services

## 2022-11-29 NOTE — ASSESSMENT & PLAN NOTE
Patient has no complaints but does have some apparent moderate dementia as she is pulling off all her telemetry  Family brought her in for a few days of a poor appetite  No complaints of chest pain  But troponins are elevated 710 --> 1224 --->  1437    Spoke with Cardiology, will treat her as a non STEMI  Put her on IV heparin drip likely for 48 hours  Start aspirin, beta-blocker and nitro  Consult cards  Check echocardiogram

## 2022-11-29 NOTE — H&P
[address] [facility]  H&P- Sirisha Ritchie 11/27/1932, 80 y o  female MRN: [id number]  Unit/Bed#: E4 -01 Encounter: [id number]  Primary Care Provider: Eva Pepe MD   Date and time admitted to hospital: 11/28/2022  1:34 PM    * Elevated troponin  Assessment & Plan  Patient has no complaints but does have some apparent moderate dementia as she is pulling off all her telemetry  Family brought her in for a few days of a poor appetite  No complaints of chest pain  But troponins are elevated 710 --> 1224 --->  1437    Spoke with Cardiology, will treat her as a non STEMI  Put her on IV heparin drip likely for 48 hours  Start aspirin, beta-blocker and nitro  Consult cards  Check echocardiogram    DM2 (diabetes mellitus, type 2) ([facility]  Assessment & Plan  Lab Results   Component Value Date    HGBA1C 6 4 (H) 08/28/2020       No results for input(s): POCGLU in the last 72 hours  Blood Sugar Average: Last 72 hrs:     Place on HISS        Chief Complaint:   Patient offers no complaints  Reportedly, family brought her in for a l poor appetite, but I do not have contact information for them and they are no longer here      History of Present Illness: Sirisha Ritchie is a 80 y o  female who presents with no complaints  She does have moderate dementia as when I am seeing her she is taking off all of her telemetry and asking what it is for and is on steadily trying to get out of bed  She offers no complaints to me  In speaking with the ER doctor she had no complaints with the ER doctor but the family said she would had a lack of appetite for the past few days  Specifically asked her if she had chest pain shortness of breath, she denied this  Patient has no specific complaints  No diaphoresis  No nausea vomiting  No pain with eating  She wants something to drink         Review of Systems:    Review of Systems   Constitutional: Negative for chills and fever     HENT: Negative for ear pain and sore throat  Eyes: Negative for pain and visual disturbance  Respiratory: Negative for cough and shortness of breath  Cardiovascular: Negative for chest pain and palpitations  Gastrointestinal: Negative for abdominal pain and vomiting  Poor appetite per family   Genitourinary: Negative for dysuria and hematuria  Musculoskeletal: Negative for arthralgias and back pain  Skin: Negative for color change and rash  Neurological: Negative for seizures and syncope  All other systems reviewed and are negative  Past Medical and Surgical History:     Past Medical History:   Diagnosis Date   • CKD (chronic kidney disease)    • [facility]    • Hyperlipidemia    • Hypertension        History reviewed  No pertinent surgical history  Home Medications:    Prior to Admission medications    Medication Sig Start Date End Date Taking? Authorizing Provider   aspirin 81 mg chewable tablet Chew 81 mg daily   Yes Historical Provider, MD   CLONIDINE HCL PO Take 0 1 mg by mouth   Yes Historical Provider, MD   ezetimibe (ZETIA) 10 mg tablet Take 10 mg by mouth daily   Yes Historical Provider, MD   hydrochlorothiazide (MICROZIDE) 12 5 mg capsule Take 12 5 mg by mouth daily   Yes Historical Provider, MD   losartan (COZAAR) 100 MG tablet Take 25 mg by mouth daily   Yes Historical Provider, MD   timolol (BETIMOL) 0 5 % ophthalmic solution 1 drop 2 (two) times a day   Yes Historical Provider, MD   Cholecalciferol 50 MCG (2000 UT) CAPS Take 1 capsule by mouth    Historical Provider, MD   Netarsudil Dimesylate (Rhopressa) 0 02 % SOLN Apply 1 drop to eye    Historical Provider, MD     I have reviewed home medications with patient personally        Allergies: No Known Allergies      Social History:    Substance Use History:   Social History     Substance and Sexual Activity   Alcohol Use Never     Social History     Tobacco Use   Smoking Status Never   Smokeless Tobacco Never     Social History Substance and Sexual Activity   Drug Use Never         Family History:    non-contributory      Physical Exam:     Vitals:   Blood Pressure: 143/65 (11/28/22 1900)  Pulse: 78 (11/28/22 1900)  Temperature: 98 °F (36 7 °C) (11/28/22 1900)  Temp Source: Temporal (11/28/22 1900)  Respirations: 18 (11/28/22 1900)  Weight - Scale: 82 1 kg (181 lb) (11/28/22 2231)  SpO2: 96 % (11/28/22 1900)    Physical Exam         Additional Data:     Lab Results: I have personally reviewed pertinent reports  Results from last 7 days   Lab Units 11/28/22  1401   WBC Thousand/uL 28 89*   HEMOGLOBIN g/dL 14 5   HEMATOCRIT % 43 2   PLATELETS Thousands/uL 218   NEUTROS PCT % 90*   LYMPHS PCT % 2*   MONOS PCT % 7   EOS PCT % 0     Results from last 7 days   Lab Units 11/28/22  1401   POTASSIUM mmol/L 3 0*   CHLORIDE mmol/L 97   CO2 mmol/L 24   BUN mg/dL 61*   CREATININE mg/dL 1 99*   CALCIUM mg/dL 8 8   ALK PHOS U/L 100   ALT U/L 31   AST U/L 53*                     Imaging: I have personally reviewed pertinent reports  XR chest 1 view portable   ED Interpretation by Hector Young MD (11/28 1545)   Primary reviewed no acute abnormality      Final Result by Yessica Tse MD (11/28 1635)      Suspect small bilateral pleural effusions with bibasilar atelectasis  The study was marked in [facility] for immediate notification  Workstation performed: [id number]               EKG, Pathology, and Other Studies Reviewed on Admission:   · EKG:  Normal sinus rhythm  Questionable incomplete left bundle branch block  No ST elevations      VTE Prophylaxis: Heparin Drip        Anticipated Length of Stay:  Patient will be admitted on an inpatient basis with an anticipated length of stay of  greater than 2 midnights  Justification for Hospital Stay:  Is possibly having a non STEMI  Putting him on a IV heparin drip  Will be for 48 hours    Length of stay will be greater than 2 minutes      Total Time for Visit, including Counseling / Coordination of Care: 45 minutes  Greater than 50% of this total time spent on direct patient counseling and coordination of care  ** Please Note: This note has been constructed using a voice recognition system   **

## 2022-11-29 NOTE — PLAN OF CARE
Problem: MOBILITY - ADULT  Goal: Maintain or return to baseline ADL function  Description: INTERVENTIONS:  -  Assess patient's ability to carry out ADLs; assess patient's baseline for ADL function and identify physical deficits which impact ability to perform ADLs (bathing, care of mouth/teeth, toileting, grooming, dressing, etc )  - Assess/evaluate cause of self-care deficits   - Assess range of motion  - Assess patient's mobility; develop plan if impaired  - Assess patient's need for assistive devices and provide as appropriate  - Encourage maximum independence but intervene and supervise when necessary  - Involve family in performance of ADLs  - Assess for home care needs following discharge   - Consider OT consult to assist with ADL evaluation and planning for discharge  - Provide patient education as appropriate  Outcome: Progressing  Goal: Maintains/Returns to pre admission functional level  Description: INTERVENTIONS:  - Perform BMAT or MOVE assessment daily    - Set and communicate daily mobility goal to care team and patient/family/caregiver  - Collaborate with rehabilitation services on mobility goals if consulted  - Perform Range of Motion 3 times a day  - Reposition patient every 2 hours    - Dangle patient 3 times a day  - Stand patient 3 times a day  - Ambulate patient 3 times a day  - Out of bed to chair 3 times a day   - Out of bed for meals 3 times a day  - Out of bed for toileting  - Record patient progress and toleration of activity level   Outcome: Progressing     Problem: Potential for Falls  Goal: Patient will remain free of falls  Description: INTERVENTIONS:  - Educate patient/family on patient safety including physical limitations  - Instruct patient to call for assistance with activity   - Consult OT/PT to assist with strengthening/mobility   - Keep Call bell within reach  - Keep bed low and locked with side rails adjusted as appropriate  - Keep care items and personal belongings within reach  - Initiate and maintain comfort rounds  - Make Fall Risk Sign visible to staff  - Offer Toileting every 2 Hours, in advance of need  - Initiate/Maintain bed alarm  - Obtain necessary fall risk management equipment: alarms  - Apply yellow socks and bracelet for high fall risk patients  - Consider moving patient to room near nurses station  Outcome: Progressing     Problem: PAIN - ADULT  Goal: Verbalizes/displays adequate comfort level or baseline comfort level  Description: Interventions:  - Encourage patient to monitor pain and request assistance  - Assess pain using appropriate pain scale  - Administer analgesics based on type and severity of pain and evaluate response  - Implement non-pharmacological measures as appropriate and evaluate response  - Consider cultural and social influences on pain and pain management  - Notify physician/advanced practitioner if interventions unsuccessful or patient reports new pain  Outcome: Progressing     Problem: INFECTION - ADULT  Goal: Absence or prevention of progression during hospitalization  Description: INTERVENTIONS:  - Assess and monitor for signs and symptoms of infection  - Monitor lab/diagnostic results  - Monitor all insertion sites, i e  indwelling lines, tubes, and drains  - Monitor endotracheal if appropriate and nasal secretions for changes in amount and color  - [place] appropriate cooling/warming therapies per order  - Administer medications as ordered  - Instruct and encourage patient and family to use good hand hygiene technique  - Identify and instruct in appropriate isolation precautions for identified infection/condition  Outcome: Progressing  Goal: Absence of fever/infection during neutropenic period  Description: INTERVENTIONS:  - Monitor WBC    Outcome: Progressing     Problem: SAFETY ADULT  Goal: Maintain or return to baseline ADL function  Description: INTERVENTIONS:  -  Assess patient's ability to carry out ADLs; assess patient's baseline for ADL function and identify physical deficits which impact ability to perform ADLs (bathing, care of mouth/teeth, toileting, grooming, dressing, etc )  - Assess/evaluate cause of self-care deficits   - Assess range of motion  - Assess patient's mobility; develop plan if impaired  - Assess patient's need for assistive devices and provide as appropriate  - Encourage maximum independence but intervene and supervise when necessary  - Involve family in performance of ADLs  - Assess for home care needs following discharge   - Consider OT consult to assist with ADL evaluation and planning for discharge  - Provide patient education as appropriate  Outcome: Progressing  Goal: Maintains/Returns to pre admission functional level  Description: INTERVENTIONS:  - Perform BMAT or MOVE assessment daily    - Set and communicate daily mobility goal to care team and patient/family/caregiver  - Collaborate with rehabilitation services on mobility goals if consulted  - Perform Range of Motion 3 times a day  - Reposition patient every 2 hours    - Dangle patient 3 times a day  - Stand patient 3 times a day  - Ambulate patient 3 times a day  - Out of bed to chair 3 times a day   - Out of bed for meals 3 times a day  - Out of bed for toileting  - Record patient progress and toleration of activity level   Outcome: Progressing  Goal: Patient will remain free of falls  Description: INTERVENTIONS:  - Educate patient/family on patient safety including physical limitations  - Instruct patient to call for assistance with activity   - Consult OT/PT to assist with strengthening/mobility   - Keep Call bell within reach  - Keep bed low and locked with side rails adjusted as appropriate  - Keep care items and personal belongings within reach  - Initiate and maintain comfort rounds  - Make Fall Risk Sign visible to staff  - Offer Toileting every 2 Hours, in advance of need  - Initiate/Maintain bed alarm  - Obtain necessary fall risk management equipment: alarms  - Apply yellow socks and bracelet for high fall risk patients  - Consider moving patient to room near nurses station  Outcome: Progressing     Problem: DISCHARGE PLANNING  Goal: Discharge to home or other facility with appropriate resources  Description: INTERVENTIONS:  - Identify barriers to discharge w/patient and caregiver  - Arrange for needed discharge resources and transportation as appropriate  - Identify discharge learning needs (meds, wound care, etc )  - Arrange for interpretive services to assist at discharge as needed  - Refer to Case Management Department for coordinating discharge planning if the patient needs post-hospital services based on physician/advanced practitioner order or complex needs related to functional status, cognitive ability, or social support system  Outcome: Progressing     Problem: Knowledge Deficit  Goal: Patient/family/caregiver demonstrates understanding of disease process, treatment plan, medications, and discharge instructions  Description: Complete learning assessment and assess knowledge base    Interventions:  - Provide teaching at level of understanding  - Provide teaching via preferred learning methods  Outcome: Progressing     Problem: Prexisting or High Potential for Compromised Skin Integrity  Goal: Skin integrity is maintained or improved  Description: INTERVENTIONS:  - Identify patients at risk for skin breakdown  - Assess and monitor skin integrity  - Assess and monitor nutrition and hydration status  - Monitor labs   - Assess for incontinence   - Turn and reposition patient  - Assist with mobility/ambulation  - Relieve pressure over bony prominences  - Avoid friction and shearing  - Provide appropriate hygiene as needed including keeping skin clean and dry  - Evaluate need for skin moisturizer/barrier cream  - Collaborate with interdisciplinary team   - Patient/family teaching  - Consider wound care consult   Outcome: Progressing

## 2022-11-29 NOTE — PLAN OF CARE
Problem: PHYSICAL THERAPY ADULT  Goal: Performs mobility at highest level of function for planned discharge setting  See evaluation for individualized goals  Description: Treatment/Interventions: Functional transfer training, LE strengthening/ROM, Therapeutic exercise, Endurance training, Cognitive reorientation, Patient/family training, Equipment eval/education, Bed mobility, Gait training, Spoke to nursing, OT  Equipment Recommended: [place] (ayesha)       See flowsheet documentation for full assessment, interventions and recommendations  Note: Prognosis: Fair  Problem List: Decreased strength, Decreased endurance, Impaired balance, Decreased mobility, Impaired judgement, Decreased safety awareness, Impaired hearing, Obesity  Assessment: pt admitted with decreased appetite  pt dx with leukocytosis, elevated troponin, hypokalemia  pt referred to PT  pt was living at [address] [place] [address] at [facility]  pt was unable to provide much social information due to severe hearing impairment and dementia  apparrently pt was ambulatory but unknown amount of assist needed for other adl's  pt demonstrated mild to moderate functional limitations due to recent illness and prior debility  pt needed min assist of 2 for bed mobility and min assist of 2 for ambulation using rw fpr 15'  pt had deficits in strength, balance, gait sequencing and stability, cognition,hearing, self care  pt will be able to return to facility with PT  Barriers to Discharge: Decreased caregiver support (unknown level of care PTA)     PT Discharge Recommendation: Return to facility with rehabilitation services    See flowsheet documentation for full assessment

## 2022-11-29 NOTE — ASSESSMENT & PLAN NOTE
· Without chest pain  · Etiology unclear  Suspect type 2 non-STEMI in the setting of CHF and decreased clearance from CKD  · Cardiology evaluation and recommendations reviewed  · Conservative treatment with heparin drip, aspirin, metoprolol and Lipitor initiated    · Anticipate the heparin drip will be discontinued today

## 2022-11-29 NOTE — ASSESSMENT & PLAN NOTE
Lab Results   Component Value Date    HGBA1C 6 4 (H) 08/28/2020     DM2 diet controlled prior to admission  With stress-induced hyperglycemia  Place on diabetic clear diet for now due to new finding of ileus and possible colitis  Place on sliding scale

## 2022-11-29 NOTE — ASSESSMENT & PLAN NOTE
Lab Results   Component Value Date    HGBA1C 6 4 (H) 08/28/2020       No results for input(s): POCGLU in the last 72 hours      Blood Sugar Average: Last 72 hrs:     Place on HISS

## 2022-11-29 NOTE — ASSESSMENT & PLAN NOTE
Lab Results   Component Value Date    EGFR 21 11/28/2022    CREATININE 1 99 (H) 11/28/2022     No clear baseline  Follow creatinine closely  Hold her HCTZ  Continue losartan for now but if creatinine and becomes more elevated, would discontinue this

## 2022-11-29 NOTE — CONSULTS
Consultation - Geriatrics   Terrie Rubio 80 y o  female MRN: [id number]  Unit/Bed#: E4 -01 Encounter: [id number]      Assessment/Plan    Cognitive Screening   · Patient with history of mild cognitive impairment   · Has been seen and evaluated by Geriatric medicine at [address] [place] [address] on 2/5/2022  · Scored 29/30 on MMSE at that time   · No further cognitive testing noted on chart review   · No TSH or vitamin B 12 levels noted in epic   · Would recommend checking these levels   · No imaging of the head noted in epic  · No MOCA noted in epic  · Would recommend OT complete MOCA to obtain new baseline   · [facility] notes that the patient is typically oriented to person and place but not time  · She is oriented to person and month but not place or year on exam today   · She believes she is still at [facility]   · Staff notes no behavioral issues   · Patient is pleasant, calm, and cooperative on exam today   · Keep mentally, physically, and socially active     Delirium   • Baseline mentation: alert and oriented to person and place   • Current mentation: alert and oriented to person and month   · Disoriented to place and year   • Patient is at high risk secondary to age, history of mild cognitive impairment, and hospitalization   • Maintain delirium precautions   · Provide redirection, reorientation, and distraction techniques  · Maintain fall and safety precautions   · Assist with ADLs/IADLs  · Avoid deliriogenic medications such as tramadol, benzodiazepines, anticholinergics, benadryl  · Treat pain using geriatric pain protocol   · Encourage oral hydration and nutrition   · Monitor for constipation and urinary retention   · Implement sleep hygiene and limit night time interuptions   · Maintain sleep-wake cycle   · Encourage early and frequent mobilization   · Encourage participation in group activities  • Most recent EKG on 11/28/2022 revealed a QTc interval of 432  • Recommend using Zyprexa 2 5 mg IM for acute behaviors  • Would avoid benzodiazepines such as Ativan for acute behaviors as it can worsen delirium     Deconditioning   • Baseline function: independent for ADLs and dependent for IADLs  • Patient is at increased risk for deconditioning secondary to MI, weakness, gait dysfunction, and hospitalization   • Continue to optimize diet, hydration, and mobility for healing   · GFR 25 on labs today   · Avoid nephrotoxic medication and renal dose medication as needed   · Keep hydrated   · PO intake  · Patient with poor oral intake prior to admission   · No meal completions charted in epic  · Encourage oral hydration and nutrition   · Will place nutrition consult  · Diabetes   · Not currently on any blood sugar checks   · Encourage diabetic diet  · Monitor for signs and symptoms of infection, dehydration, DVT, and skin breakdown    Frailty   Clinical Frail Scale: 6- Moderately Frail  · Need help with all outside activities  · Need help with stairs and bathing  · May need assistance with dressing  • Most recent albumin on 11/28/2022 noted to be 2 8  • Will place nutrition consult  • Encourage protein supplementation     Ambulatory Dysfunction/Falls  • [facility] note one fall prior to her hospital admission   · No other recent falls per nursing  · Patient had been ambulating independently with a roller walker   · Nursing noted recent decline and made patient assist x 1 with the roller walker for safety   • PT/OT consulted   • Maintain fall and safety precautions   • Encourage adequate oral hydration and nutrition   • Out of bed as tolerated     Impaired Vision   • Patient does have vision difficulties  · Wears eyeglasses   • Recommend use of corrective lenses at all appropriate times  • Encourage adequate lighting and encourage use of assistance with ambulation  • Keep personal belongings close to avoid reaching  • Encourage appropriate footwear at all times  • Recommend large font for printed materials provided to patient    Impaired Hearing   • Patient does have impaired hearing   · Does not have hearing aids  · Is using sound amplifier here   · Hearing impairment strongly correlated with depression, cognitive impairment, delirium and falls in the older adult  · Use sound amplifier  · Speak face to face  · Use clear dictation and enunciation of words    Dentition/Appetite   • Patient noted to have poor appetite prior to admission  · Facility notes she did not eat anything at all for 2 days prior to coming in  • No meals charted in epic  • Currently on a cardiac diet   • Ensure meal consistency is appropriate for all abilities   • Will consult nutrition services   • Continue aspiration precautions     Elimination   • Patient is incontinent of bowel and bladder at baseline per facility   · Was noted to be continent this morning   • Patient denies difficulty voiding   • No documented bowel movement since admission   • Not currently on a bowel regimen   • Will order the following bowel regimen   · Colace 100 mg BID   · Senna 8 6 mg BID   · Can consider Miralax daily or daily prn if no bowel movement in the next 24-48 hours   • Monitor for constipation and urinary retention     Insomnia   • Facility notes no difficulties with sleep   • Does not appear to take any medication for sleep at baseline   • First line is behavioral therapy   • Avoid sedative hypnotics including benzodiazepines and benadryl  • Encourage staying awake during the day   • Encourage daytime activities and morning exercise   • Decrease or eliminate daytime naps   • Avoid caffeine especially during late afternoon and evening hours  • Establish a nighttime routine  • Implement sleep hygiene and limit nighttime interruptions  • Can consider melatonin 3 mg daily at bedtime for sleep if needed     Anxiety/Depression  • Facility staff not aware of any anxiety or depression   • Mood appears stable on exam today   • Continue supportive care     Elevated Troponin  · Patient with elevated troponins on admission without chest pain   · Etiology unclear   · Currently on a heparin drip, aspirin, and Lipitor  · Cardiology following     Elevated proBNP  · Patient with BNP of 19,814 on admission yesterday   · Cardiology consulted and suspect diastolic HF  · Management per cardiology     Hypokalemia  · Patient with potassium of 2 2 this morning   · IV potassium ordered  · Management per primary team     Home Safety  · Patient resides at [facility]   · She does not do any cooking or cleaning   · [employer] manages the finances  · Facility staff manages medication   · Patient no longer drives    [address]  · Level 1 Full Code     Home Medication Review   · Darwin Ruano for medication list and it is not faxing through   · Attempted to call back but no answer  · Will attempt to get medication list again tomorrow    History of Present Illness      Physician Requesting Consult: Danish Elliott Pe*  Reason for Consult / Principal Problem: Confusion   Hx and PE limited by: Confusion     HPI: Yamile Gonsalez is a 80y o  year old female who has hypertension, diabetes, hyperlipidemia, CKD 3, and mild cognitive impairment and presented to the hospital for decreased appetite  On admission, she was noted to have elevated troponins and and elevated proBNP  She denied chest pain  She was evaluated by cardiology and and her elevated troponins are suspected to be multfactorial in the setting of suspected diastolic HF, hypertension, CKD, and/or sepsis with severe leukocytosis  Her EKG revealed no ischemic changes  She was started on a heparin drip, Lipitor, and aspirin  The patient states that she is currently sitting at [place]  She states that at her baseline she ambulates with a roller walker for stability and safety  She notes no recent falls  She notes that she does have trouble hearing   Patient is notes that she is continent of bowel and bladder at baseline  Care was coordinated with nurse Tarun Wasserman at [facility]  Tarun Wasserman states that she knows the patient well  She states that the patient does not do any cooking or cleaning  Per charting, her nephew Surya Leonard is her POA and he handles her finances  She notes that the facility manages her medication  She states that for the past week, she has noticed an overall decline in the patient  She states that at her baseline, the patient ambulates independently  She states that she noticed the patient getting a bit weaker and she had staff assisting her with the roller walker  She notes that she was assist x 1 prior to her admission  She states that prior to her admission the patient was noted to have a rash under her breasts  For that reason, Tarun Wasserman had staff bathing her  She notes that at her baseline, the patient is independent with her ADLs  She also states that for two days prior to admission, the patient had not had any oral intake  She states that at her baseline, the patient alert and oriented to person and place  She notes that she typically is not oriented to time  She states that the patient did have one fall prior to her admission, however, she noted no other recent falls  She states that the patient figueroa have impaired vision and hearing  She notes that the patient does not wear dentures and at baseline typically does have a good appetite  She notes the patient to be incontinent of bowel and bladder  She is unaware of any history of anxiety or depression  The patient was seen and evaluated today at the bedside for geriatric consult  She is noted to be sitting up in her chair in no acute distress  She is alert and oriented to person and month but is disoriented to place and year  She states that she is feeling okay today and offers no acute complaints  She is denying pain on exam  She denies dizziness, headaches, and vision changes   She is noted to be using the hearing amplifier and this seems to be working well for her  She denies any chest pain, shortness of breath, and cough  She denies nausea, vomiting, constipation, and diarrhea  She states that she has been eating and that she is sleeping well  Care was coordinated with patients nurse Enoch Pereira  She states that the patient has not been eating more than 2-3 bites per meal all day  She states that she did attempt to be in the room to encourage her but she would not eat  She notes no acute issues today or overnight  Care was also coordinated with Dr Pauline Townsend  Consults    Review of Systems   Constitutional: Positive for activity change and appetite change  Negative for chills, fatigue and fever  Respiratory: Negative for cough and shortness of breath  Cardiovascular: Negative for chest pain and leg swelling  Gastrointestinal: Negative for abdominal distention, abdominal pain, constipation, diarrhea, nausea and vomiting  Genitourinary: Negative for difficulty urinating and dysuria  Musculoskeletal: Positive for gait problem  Negative for arthralgias and myalgias  Skin: Negative for color change and pallor  Neurological: Positive for weakness  Negative for dizziness and headaches  Psychiatric/Behavioral: Positive for confusion (appears to be at baseline)  Negative for behavioral problems and sleep disturbance  The patient is not nervous/anxious  Historical Information   Past Medical History:   Diagnosis Date   • CKD (chronic kidney disease)    • Diabetes mellitus ([facility] [address] )    • Hyperlipidemia    • Hypertension      History reviewed  No pertinent surgical history  Social History   Social History     Substance and Sexual Activity   Alcohol Use Never     Social History     Substance and Sexual Activity   Drug Use Never     Social History     Tobacco Use   Smoking Status Never   Smokeless Tobacco Never         Family History: History reviewed  No pertinent family history      Meds/Allergies   Current meds:   Current Facility-Administered Medications   Medication Dose Route Frequency   • aspirin chewable tablet 81 mg  81 mg Oral Daily   • atorvastatin (LIPITOR) tablet 40 mg  40 mg Oral Daily With Dinner   • ezetimibe (ZETIA) tablet 10 mg  10 mg Oral Daily   • heparin (porcine) 25,000 units in 0 45% NaCl 250 mL infusion (premix)  3-20 Units/kg/hr (Order-Specific) Intravenous Titrated   • heparin (porcine) injection 2,000 Units  2,000 Units Intravenous Q1H PRN   • heparin (porcine) injection 4,000 Units  4,000 Units Intravenous Q1H PRN   • metoprolol tartrate (LOPRESSOR) tablet 25 mg  25 mg Oral Q12H ALEKSANDRA   • nitroglycerin (NITROSTAT) SL tablet 0 4 mg  0 4 mg Sublingual Q5 Min PRN   • ondansetron (ZOFRAN) injection 4 mg  4 mg Intravenous Q6H PRN   • potassium chloride (K-DUR,KLOR-CON) CR tablet 40 mEq  40 mEq Oral BID   • sodium chloride 0 9 % with KCl 20 mEq/L infusion (premix)  50 mL/hr Intravenous Continuous   • timolol (TIMOPTIC) 0 5 % ophthalmic solution 1 drop  1 drop Both Eyes BID      No Known Allergies    Objective   Vitals: Blood pressure 135/65, pulse 67, temperature 99 °F (37 2 °C), temperature source Temporal, resp  rate 22, weight 82 1 kg (181 lb), SpO2 97 %  ,There is no height or weight on file to calculate BMI  Physical Exam  Vitals reviewed  Constitutional:       Appearance: Normal appearance  HENT:      Head: Normocephalic  Mouth/Throat:      Mouth: Mucous membranes are dry  Eyes:      General: No scleral icterus  Conjunctiva/sclera: Conjunctivae normal    Cardiovascular:      Rate and Rhythm: Normal rate and regular rhythm  Heart sounds: No murmur heard  Pulmonary:      Effort: Pulmonary effort is normal  No respiratory distress  Breath sounds: Normal breath sounds  Abdominal:      General: Bowel sounds are normal  There is no distension  Palpations: Abdomen is soft  Tenderness: There is no abdominal tenderness  Musculoskeletal:         General: No swelling or tenderness     Skin: General: Skin is warm and dry  Neurological:      General: No focal deficit present  Mental Status: She is alert  Mental status is at baseline  She is disoriented  Motor: Weakness present  Gait: Gait abnormal       Comments: Oriented to person and month   Disoriented to place and year (believes that she is at [place] which is where she resides)  Hx of cognitive impairment          Lab Results:   Results from last 7 days   Lab Units 11/29/22  0552   WBC Thousand/uL 28 28*   HEMOGLOBIN g/dL 13 7   HEMATOCRIT % 41 0   PLATELETS Thousands/uL 226        Results from last 7 days   Lab Units 11/29/22  0552 11/28/22  1401   POTASSIUM mmol/L 2 2* 3 0*   CHLORIDE mmol/L 101 97   CO2 mmol/L 23 24   BUN mg/dL 58* 61*   CREATININE mg/dL 1 71* 1 99*   CALCIUM mg/dL 8 6 8 8   ALK PHOS U/L  --  100   ALT U/L  --  31   AST U/L  --  53*       Imaging Studies: I have personally reviewed pertinent reports  EKG, Pathology, and Other Studies: I have personally reviewed pertinent reports      VTE Prophylaxis: Heparin    Code Status: Level 1 - Full Code

## 2022-11-29 NOTE — ASSESSMENT & PLAN NOTE
Significant neutrophilic leukocytosis  Not toxic or ill-appearing  CT with possible colitis    She is mildly tender to palpation in the right lower quadrant and presented with poor appetite  Check blood cultures  Check UA for complete  Check C diff PCR  COVID flu and RSV negative  Start empiric ceftriaxone and Flagyl  Consult ID given significant leukocytosis not proportional to physical exam and CT findings

## 2022-11-29 NOTE — ASSESSMENT & PLAN NOTE
· Elevated BNP associated bilateral pleural effusion suggesting LV dysfunction  · Suspect underlying congestive heart failure  Systolic versus diastolic    · Await formal echocardiogram  · Continue metoprolol 25 p o  B i d

## 2022-11-29 NOTE — H&P
[address] 11/27/1932, 80 y o  female MRN: [id number]  Unit/Bed#: E4 -01 Encounter: [id number]  Primary Care Provider: Andres Alex MD   Date and time admitted to hospital: 11/28/2022  1:34 PM    * Elevated troponin  Assessment & Plan  · Without chest pain  · Etiology unclear  Type 1 versus type 2 non-STEMI in the setting of CHF and decreased clearance from CKD  · Was placed on heparin, aspirin, and Lipitor last night  · Await further recommendations per Cardiology  Elevated brain natriuretic peptide (BNP) level  Assessment & Plan  · Elevated BNP associated bilateral pleural effusion suggesting LV dysfunction  · Await formal echocardiogram  · Continue metoprolol 25 p o  B i d  Hypokalemia  Assessment & Plan  Level critically low at 2 2  Start oral and IV potassium supplementation    DM[facility]di[facility]  Assessment & Plan  Lab Results   Component Value Date    HGBA1C 6 4 (H) 08/28/2020     DM2 diet controlled prior to admission  With stress-induced hyperglycemia  Continue diabetic diet  Place on sliding scale      HTN (hypertension)  Assessment & Plan  Hold HCTZ  Continue metoprolol 25 q 12      VTE Pharmacologic Prophylaxis:   Pharmacologic: Heparin Drip  Mechanical VTE Prophylaxis in Place: No    Patient Centered Rounds: Discussed with her nurse    Discussions with Specialists or Other Care Team Provider:  History and physical reviewed    Education and Discussions with Family / Patient:  Spoke with ELSIE Chapa    Time Spent for Care: 25 minutes  More than 50% of total time spent on counseling and coordination of care as described above  Current Length of Stay: 0 day(s)    Current Patient Status: Observation   Certification Statement: The patient, admitted on an observation basis, will now require > 2 midnight hospital stay due to Elevated troponin and elevated BNP    Discharge Plan:  Not ready for discharge    Will switch to inpatient    Code Status: Level 1 - Full Code    Subjective:   Seen and examined during rounds  Denies chest pain   Her breakfast was in front of her but she just had no appetite    Objective:     Vitals:   Temp (24hrs), Av 8 °F (36 6 °C), Min:97 5 °F (36 4 °C), Max:98 3 °F (36 8 °C)    Temp:  [97 5 °F (36 4 °C)-98 3 °F (36 8 °C)] 97 8 °F (36 6 °C)  HR:  [71-81] 81  Resp:  [18-22] 22  BP: (122-185)/(65-91) 122/91  SpO2:  [95 %-97 %] 96 %  There is no height or weight on file to calculate BMI  Input and Output Summary (last 24 hours): Intake/Output Summary (Last 24 hours) at 2022 1016  Last data filed at 2022 1740  Gross per 24 hour   Intake 1000 ml   Output --   Net 1000 ml       Physical Exam:     Physical Exam  Constitutional:       Appearance: She is not ill-appearing or diaphoretic  HENT:      Head: Normocephalic and atraumatic  Ears:      Comments: Very poor hearing  Hearing is worse on the left compared to the right     Nose: No congestion or rhinorrhea  Eyes:      General: No scleral icterus  Cardiovascular:      Rate and Rhythm: Normal rate and regular rhythm  Pulmonary:      Effort: Pulmonary effort is normal  No respiratory distress  Breath sounds: No wheezing or rales  Abdominal:      General: Abdomen is flat  There is no distension  Palpations: Abdomen is soft  Tenderness: There is no abdominal tenderness  Musculoskeletal:      Cervical back: Neck supple  Right lower leg: No edema  Left lower leg: No edema  Skin:     General: Skin is warm and dry  Neurological:      Mental Status: She is alert  She is disoriented     Psychiatric:         Mood and Affect: Mood normal          Behavior: Behavior normal      Additional Data:     Labs:    Results from last 7 days   Lab Units 22  0552 22  2259 22  1401   WBC Thousand/uL 28 28*   < > 28 89*   HEMOGLOBIN g/dL 13 7   < > 14 5   HEMATOCRIT % 41 0   < > 43 2   PLATELETS Thousands/uL 226   < > 218   NEUTROS PCT %  --   --  90*   LYMPHS PCT %  --   --  2*   MONOS PCT %  --   --  7   EOS PCT %  --   --  0    < > = values in this interval not displayed  Results from last 7 days   Lab Units 11/29/22  0552 11/28/22  1401   SODIUM mmol/L 139 134*   POTASSIUM mmol/L 2 2* 3 0*   CHLORIDE mmol/L 101 97   CO2 mmol/L 23 24   BUN mg/dL 58* 61*   CREATININE mg/dL 1 71* 1 99*   ANION GAP mmol/L 15* 13   CALCIUM mg/dL 8 6 8 8   ALBUMIN g/dL  --  2 8*   TOTAL BILIRUBIN mg/dL  --  1 49*   ALK PHOS U/L  --  100   ALT U/L  --  31   AST U/L  --  53*   GLUCOSE RANDOM mg/dL 231* 313*     Results from last 7 days   Lab Units 11/28/22  2318   INR  1 33*     * I Have Reviewed All Lab Data Listed Above  * Additional Pertinent Lab Tests Reviewed:  All Labs Within Last 24 Hours Reviewed    Imaging:    Imaging Reports Reviewed Today Include:  Chest x-ray      Recent Cultures (last 7 days):           Last 24 Hours Medication List:   Current Facility-Administered Medications   Medication Dose Route Frequency Provider Last Rate   • aspirin  81 mg Oral Daily Leonid S Prechtel, DO     • atorvastatin  40 mg Oral Daily With [employer] S Prechtel, DO     • ezetimibe  10 mg Oral Daily Leonid S Prechtel, DO     • heparin (porcine)  3-20 Units/kg/hr (Order-Specific) Intravenous Titrated Jolayne Bottoms Prechtel, DO 12 Units/kg/hr (11/28/22 2331)   • heparin (porcine)  2,000 Units Intravenous Q1H PRN Jolayne Bottoms Prechtel, DO     • heparin (porcine)  4,000 Units Intravenous Q1H PRN Leonid S Prechtel, DO     • magnesium sulfate  2 g Intravenous Once TAN Julien     • metoprolol tartrate  25 mg Oral Q12H [facility] Leonid S Prechtel, DO     • nitroglycerin  0 4 mg Sublingual Q5 Min PRN Leonid S Prechtel, DO     • ondansetron  4 mg Intravenous Q6H PRN Jolayne Bottoms Prechtel, DO     • potassium chloride  40 mEq Oral BID Ana Fairchild MD     • potassium chloride  20 mEq Intravenous Once TAN Julien 20 mEq (11/29/22 5230)   • sodium chloride 0 9 % with KCl 20 mEq/L  50 mL/hr Intravenous Continuous Miracle Zamorano MD 50 mL/hr (11/29/22 2572)   • timolol  1 drop Both Eyes BID Quintin Lee,           Today, Patient Was Seen By: Miracle Zamorano MD    ** Please Note: Dictation voice to text software may have been used in the creation of this document   **

## 2022-11-29 NOTE — OCCUPATIONAL THERAPY NOTE
Occupational Therapy Evaluation(time=0920-0946)     Patient Name: Mabel BENNETT Date: 11/29/2022  Problem List  Principal Problem:    Elevated troponin  Active Problems:    DM2 (diabetes mellitus, type 2) (HCC)    CKD (chronic kidney disease)    Hypokalemia    HTN (hypertension)    Elevated brain natriuretic peptide (BNP) level    Neutrophilic leukocytosis    Past Medical History  Past Medical History:   Diagnosis Date    CKD (chronic kidney disease)     Diabetes mellitus ([address] )     Hyperlipidemia     Hypertension      Past Surgical History  History reviewed  No pertinent surgical history  11/29/22 0920   Note Type   Note type Evaluation   Pain Assessment   Pain Assessment Tool 0-10   Pain Score No Pain   Restrictions/Precautions   Weight Bearing Precautions Per Order No   Other Precautions Cognitive; Chair Alarm; Bed Alarm;Hard of hearing; Fall Risk  (able to use pocket-talker)   Home Living   Type of Home SNF  ([facility]--per pt's statement)   Prior Function   Lives With Facility staff   Falls in the last 6 months 0   Comments PTA pt states independence with her ADLs, transfers, ambulation--with RW; neg falls, ? accuracy of statements 2* pt being a poor historian  Lifestyle   Autonomy PTA pt states independence with her ADLs, transfers, ambulation--with RW; neg falls, ? 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   Intrinsic Gratification watching TV   Subjective   Subjective "I have to go to the bathroom "   ADL   Where Assessed Edge of bed   Eating Assistance 5  Supervision/Setup   Grooming Assistance 5  Supervision/Setup   UB Bathing Assistance 4  Minimal Assistance   LB [address] 3  Moderate Assistance   [address] 2  Maximal [address]  1  Total Assistance   Bed Mobility   Rolling R 4 Minimal assistance   Additional items Assist x 1; Increased time required;Verbal cues;LE management   Rolling L 4  Minimal assistance   Additional items Assist x 1; Increased time required;Verbal cues;LE management   Supine to Sit 4  Minimal assistance   Additional items Assist x 1; Increased time required;Verbal cues;LE management   Transfers   Sit to Stand 4  Minimal assistance   Additional items Assist x 1   Stand to Sit 4  Minimal assistance   Additional items Assist x 1; Increased time required;Verbal cues   Functional Mobility   Functional Mobility 4  Minimal assistance   Additional Comments x1   Additional items Rolling walker   Balance   Static Sitting Fair +   Dynamic Sitting Fair   Static Standing Fair -   Dynamic Standing Poor +   Activity Tolerance   Activity Tolerance Patient limited by fatigue   Medical Staff Made Aware nsg, P T    RUE Assessment   RUE Assessment X  (limited b/l shr AROM(i e flex=20-30*); PROM=WFLs; elbow-distal=WFLs(AROM))   RUE Strength   RUE Overall Strength   (shr=2-/5, elbow-distal=4/5)   LUE Assessment   LUE Assessment X  (limited b/l shr AROM(i e flex=20-30*); poor shr PROM; elbow-distal=WFLs(AROM))   LUE Strength   LUE Overall Strength   (shr=2-/5, elbow-distal=4/5)   Hand Function   Gross Motor Coordination Functional   Fine Motor Coordination Functional   Sensation   Light Touch No apparent deficits   Proprioception   Proprioception No apparent deficits   Vision-Basic Assessment   Current Vision   (glasses)   Vision - Complex Assessment   Acuity Able to read clock/calendar on wall without difficulty   Psychosocial   Psychosocial (WDL) WDL   Perception   Inattention/Neglect Appears intact   Cognition   Overall Cognitive Status Impaired   Arousal/Participation Alert   Attention Attends with cues to redirect   Orientation Level Oriented to person;Disoriented to place; Disoriented to time;Disoriented to situation   Memory Decreased short term memory;Decreased recall of recent events;Decreased recall of precautions   Following Commands Follows one step commands with increased time or repetition   Comments hx dementia   Assessment   Limitation Decreased ADL status; Decreased UE ROM; Decreased UE strength;Decreased Safe judgement during ADL;Decreased cognition;Decreased endurance;Decreased high-level ADLs   Prognosis Fair   Assessment Pt is a 79y/o female admitted to the hospital 2* decrease appetite  Pt noted with elevated troponins, elevated BNP, and hypokalemia  Pt with PMH CKD, DM, HTN, and dementia  PTA pt states independence with her ADLs, transfers, ambulation--with RW; neg falls, ? accuracy of statements 2* pt being a poor historian  During initial eval, pt demonstrated deficits with her functional balance, functional mobility, ADL status, transfer safety, b/l UE ROM/strength, activity tolerance(currently fair=15-20mins), and cognition(i e memory, orientation, problem-solving, and judgement/safety)  Pt would benefit from continued OT tx for the above deficits  2-3xwk/1-2wks  The patient's raw score on the AM-PAC Daily Activity inpatient short form is 16, standardized score is 35 96, less than 39 4  Patients at this level are likely to benefit from discharge to post-acute rehabilitation services  Please refer to the recommendation of the Occupational Therapist for safe discharge planning  Goals   Patient Goals none stated   STG Time Frame   (1-7 days)   Short Term Goal #1 Pt will demonstrate improved activity tolerance to good(20-30mins) and standing tolerance to 3-5mins to assist with ADLs  Short Term Goal #2 Pt will demonstrate mod I with their bed mobility to facilitate EOB ADLs  Short Term Goal  Pt will demonstrate proper walker/transfer safety 100% of the time  LTG Time Frame   (7-14 days)   Long Term Goal #1 Pt will tolerate continued cognitive/home-safety assessment and appropriate d/c recommendations will be provided     Long Term Goal #2 Pt will demonstrate g/g- balance with all functional activities  Long Term Goal Pt will demonstrate mod I with their UE and LE bathing/dresssing  Plan   Treatment Interventions ADL retraining;Functional transfer training;UE strengthening/ROM; Endurance training;Patient/family training;Cognitive reorientation;Equipment evaluation/education; Compensatory technique education;Continued evaluation   Goal Expiration Date 12/13/22   OT Treatment Day 0   OT Frequency 2-3x/wk   Recommendation   OT Discharge Recommendation Return to facility with rehabilitation services   AM-PAC Daily Activity Inpatient   Lower Body Dressing 2   Bathing 2   Toileting 2   Upper Body Dressing 3   Grooming 3   Eating 4   Daily Activity Raw Score 16   Daily Activity Standardized Score (Calc for Raw Score >=11) 35 96   AM-PAC Applied Cognition Inpatient   Following a Speech/Presentation 3   Understanding Ordinary Conversation 3   Taking Medications 2   Remembering Where Things Are Placed or Put Away 2   Remembering List of 4-5 Errands 2   Taking Care of Complicated Tasks 2   Applied Cognition Raw Score 14   Applied Cognition Standardized Score 32 02   Haley Stevenson

## 2022-11-29 NOTE — CONSULTS
Consult - Cardiology   Jonnathan Wiley 80 y o  female MRN: [id number]  Unit/Bed#: E4 -01 Encounter: [id number]        Reason For Consult:  Elevated troponin               ASSESSMENT:  Elevated troponin   - high sensitivity troponin trend of 727, 1224, 1437  Elevated NT proBNP   - NT pro BNP 19814 upon arrival     - chest x-ray revealed possible small bilateral pleural effusions with bibasilar atelectasis, hypo-inflated lungs  Acute kidney injury on Chronic kidney disease, stage III  Electrolyte derangements including hypokalemia, hypomagnesemia  Leukocytosis  Hypertension  Hyperlipidemia  Diabetes mellitus type 2    PLAN/ DISCUSSION:     · Currently on heparin infusion per ACS protocol  · Will repeat random high sensitivity troponin to ensure downward trend  · Status post aspirin 324 mg x 1 with transition to aspirin 81 mg daily  · Echocardiogram ordered to assess cardiac structure and function  · Further recommendations forthcoming pending repeat troponin and echocardiogram  At this time, will pursue medical treatment for elevated troponin level  · NT proBNP elevated, however, patient does not examine overtly volume overloaded in acute heart failure  · Blood pressure elevated upon arrival, improved more recently  Currently on metoprolol tartrate 25 mg twice daily  Outpatient ARB and HCTZ on hold in light of acute renal failure  · Continue statin therapy with atorvastatin 40 mg daily, ezetimibe 10 mg daily  · Noted significant hypokalemia with most recent potassium 2 2; orders placed for K-Dur 40 mEq already in place  Will provide potassium chloride 20 mEq IVPB as well as patient has gentle IVF hydration with potassium supplementation infusing  Recommend repeat potassium level this afternoon to ensure stability  · Most recent magnesium 1 7, will provide magnesium sulfate 2 g IVPB now as well       History Of Present Illness:  42-year-old female presented to [facility] via EMS from [facility] for evaluation of decreased appetite over the past few days  Per documentation, patient stated that she is unsure why she was in the hospital as she denied chest pain, shortness of breath, cough, fevers, chills, abdominal pain, diarrhea, constipation  Upon assessment and evaluation, patient resting comfortably in bed  Patient is awake and cooperative  She is not oriented to place and is not sure why she is in the hospital  She states that she feels fine  She was able to guess the year and states that she lives at [facility]  She denies chest pain or any generalized pain, shortness of breath, palpitations  Per documentation review, most recent office visits from 2020  Patient has not followed with a cardiologist prior  Past Medical History:        Past Medical History:   Diagnosis Date   • CKD (chronic kidney disease)    • [facility] )    • Hyperlipidemia    • Hypertension     History reviewed  No pertinent surgical history  Allergy:        No Known Allergies    Medications:       Prior to Admission medications    Medication Sig Start Date End Date Taking? Authorizing Provider   aspirin 81 mg chewable tablet Chew 81 mg daily   Yes Historical Provider, MD   CLONIDINE HCL PO Take 0 1 mg by mouth   Yes Historical Provider, MD   ezetimibe (ZETIA) 10 mg tablet Take 10 mg by mouth daily   Yes Historical Provider, MD   hydrochlorothiazide (MICROZIDE) 12 5 mg capsule Take 12 5 mg by mouth daily   Yes Historical Provider, MD   losartan (COZAAR) 100 MG tablet Take 25 mg by mouth daily   Yes Historical Provider, MD   timolol (BETIMOL) 0 5 % ophthalmic solution 1 drop 2 (two) times a day   Yes Historical Provider, MD   Cholecalciferol 50 MCG (2000 UT) CAPS Take 1 capsule by mouth    Historical Provider, MD   Netarsudil Dimesylate (Rhopressa) 0 02 % SOLN Apply 1 drop to eye    Historical Provider, MD       Family History:     History reviewed   No pertinent family history  Social History:       Social History     Socioeconomic History   • Marital status:      Spouse name: None   • Number of children: None   • Years of education: None   • Highest education level: None   Occupational History   • None   Tobacco Use   • Smoking status: Never   • Smokeless tobacco: Never   Substance and Sexual Activity   • Alcohol use: Never   • Drug use: Never   • Sexual activity: None   Other Topics Concern   • None   Social History Narrative   • None     Social Determinants of Health     Financial Resource Strain: Not on file   Food Insecurity: Not on file   Transportation Needs: Not on file   Physical Activity: Not on file   Stress: Not on file   Social Connections: Not on file   Intimate Partner Violence: Not on file   Housing Stability: Not on file       ROS:  Negative except otherwise aforementioned above  Remainder review of systems is negative    Exam:  General: awake, interactive, cooperative  Head: Normocephalic, atraumatic  Eyes:  EOMI  Pupils - equal, round, reactive to accomodation, drainage present   Oropharynx: dry and normal-appearing mucosa  Neck: supple, symmetrical, trachea midline   Heart:  RRR, No: murmer, rub or gallop, S1 & S2 normal   Respiratory effort / Chest Inspection: unlabored  Lungs:  normal air entry, lungs clear to auscultation and no rales, rhonchi or wheezing   Abdomen: flat, normal findings: bowel sounds normal and soft, non-tender  Lower Limbs:  no pitting edema  Pulses[de-identified]  RLE - DP: present 2+                 LLE - DP: present 2+  Musculoskeletal: ROM grossly normal      DATA:      ECG:                       Telemetry: SR with PVCs, triplets via telemetry review       Weights: Wt Readings from Last 3 Encounters:   11/28/22 82 1 kg (181 lb)   , There is no height or weight on file to calculate BMI           Lab Studies:           Results from last 7 days   Lab Units 11/29/22  0552   TRIGLYCERIDES mg/dL 135   HDL mg/dL 29*     Results from last 7 days   Lab Units 11/29/22  0552 11/28/22  2259 11/28/22  1401   WBC Thousand/uL 28 28* 28 45* 28 89*   HEMOGLOBIN g/dL 13 7 13 9 14 5   HEMATOCRIT % 41 0 41 2 43 2   PLATELETS Thousands/uL 226 193 218   ,   Results from last 7 days   Lab Units 11/29/22  0552 11/28/22  1401   POTASSIUM mmol/L 2 2* 3 0*   CHLORIDE mmol/L 101 97   CO2 mmol/L 23 24   BUN mg/dL 58* 61*   CREATININE mg/dL 1 71* 1 99*   CALCIUM mg/dL 8 6 8 8   ALK PHOS U/L  --  100   ALT U/L  --  31   AST U/L  --  53*

## 2022-11-29 NOTE — PHYSICAL THERAPY NOTE
Physical Therapy Evaluation    Performed at least 2 patient identifiers during session:  Patient Active Problem List   Diagnosis    Elevated troponin    DM2 (diabetes mellitus, type 2) (HCC)    CKD (chronic kidney disease)    Hypokalemia    HTN (hypertension)    Elevated brain natriuretic peptide (BNP) level    Neutrophilic leukocytosis       Past Medical History:   Diagnosis Date    CKD (chronic kidney disease)     Diabetes mellitus [address] )     Hyperlipidemia     Hypertension        History reviewed  No pertinent surgical history  11/29/22 0948   PT Last Visit   PT Visit Date 11/29/22   Note Type   Note type Evaluation   Pain Assessment   Pain Assessment Tool 0-10   Pain Score No Pain   Restrictions/Precautions   Weight Bearing Precautions Per Order No   Other Precautions Cognitive; Chair Alarm; Bed Alarm; Fall Risk;Hard of hearing   Home Living   Type of Home Other (Comment)  ([facility], unknown level of care )   Additional Comments unknown dme   Prior Function   Level of [place]   (unknown prior level of function, pt very [place], impaired orientation)   Lives With Facility staff   Falls in the last 6 months 0  (pt denies falls)   Comments pt reports being indep with adl's but pt poor historian   General   Family/Caregiver Present No   Cognition   Overall Cognitive Status Impaired   Orientation Level Oriented to person;Oriented to place   Subjective   Subjective no complaint   RUE Assessment   RUE Assessment X   LUE Assessment   LUE Assessment X   RLE Assessment   RLE Assessment X  (str 4/5)   LLE Assessment   LLE Assessment X  (str 4/5)   Coordination   Movements are Fluid and Coordinated 1   Sensation WFL  (ticklish on feet)   Bed Mobility   Rolling R 4  Minimal assistance   Additional items Assist x 1; Increased time required;Verbal cues;LE management   Rolling L 4  Minimal assistance   Additional items Assist x 1; Increased time required;Verbal cues;LE management   Supine to Sit 4  Minimal assistance   Additional items Assist x 1; Increased time required;Verbal cues;LE management   Transfers   Sit to Stand 4  Minimal assistance   Additional items Assist x 1;Verbal cues; Increased time required   Stand to Sit 4  Minimal assistance   Additional items Assist x 1; Increased time required;Verbal cues   Toilet transfer 4  Minimal assistance   Additional items Assist x 1;Assist x 2; Increased time required; Impulsive;Verbal cues;Standard toilet  (uncontrolled descent)   Ambulation/Elevation   Gait pattern Forward Flexion;Decreased foot clearance; Inconsistent gay; Excessively slow   Gait Assistance 4  Minimal assist   Additional items Assist x 1;Verbal cues; Tactile cues   Assistive Device Rolling walker   Distance 15' x2   Ambulation/Elevation Additional Comments spO2 95%, HR 90   Balance   Static Sitting Fair +   Dynamic Sitting Fair   Static Standing Fair -   Dynamic Standing Poor +   Ambulatory Poor +   Endurance Deficit   Endurance Deficit No   Activity Tolerance   Activity Tolerance Treatment limited secondary to medical complications (Comment)   Medical Staff Made Aware OT   Nurse Made Aware yes   Assessment   Prognosis Fair   Problem List Decreased strength;Decreased endurance; Impaired balance;Decreased mobility; Impaired judgement;Decreased safety awareness; Impaired hearing;Obesity   Assessment pt admitted with decreased appetite  pt dx with leukocytosis, elevated troponin, hypokalemia  pt referred to PT  pt was living at [address] [place] [address] at [facility]  pt was unable to provide much social information due to severe hearing impairment and dementia  apparrently pt was ambulatory but unknown amount of assist needed for other adl's  pt demonstrated mild to moderate functional limitations due to recent illness and prior debility  pt needed min assist of 2 for bed mobility and min assist of 2 for ambulation using rw fpr 15'   pt had deficits in strength, balance, gait sequencing and stability, cognition,hearing, self care  pt will be able to return to facility with PT  Barriers to Discharge Decreased caregiver support  (unknown level of care PTA)   Goals   Patient Goals none offered   STG Expiration Date 12/06/22   Short Term Goal #1 supervision for bed mobility, transfers , amb using rw for 150'  imrpove strength and balance by 1/2 grade  demonstrate god safety practices   Plan   Treatment/Interventions Functional transfer training;LE strengthening/ROM; Therapeutic exercise; Endurance training;Cognitive reorientation;Patient/family training;Equipment eval/education; Bed mobility;Gait training;Spoke to nursing;OT   PT Frequency 3-5x/wk   Recommendation   PT Discharge Recommendation Return to facility with rehabilitation services   Equipment Recommended Walker  (has)   [address] in Bed Without Bedrails 3   Lying on Back to Sitting on Edge of Flat Bed 3   Moving Bed to Chair 3   Standing Up From Chair 3   Walk in Room 3   Climb 3-5 Stairs 2   Basic Mobility Inpatient Raw Score 17   Basic Mobility Standardized Score 39 67   Highest Level Of Mobility   JH-HLM Goal 5: Stand one or more mins   JH-HLM Achieved 6: Walk 10 steps or more       An AM-PAC Basic Mobility standardized score less than 42 9 suggests the patient may benefit from discharge to post-acute rehab services      History: co - morbidities, fall risk, use of assistive device, assist for adl's, cognition, multiple lines  Exam: impairments in locomotion, musculoskeletal, balance, cardiac,  cognition   Clinical: unstable/unpredictable- fall risk due to dementia  Complexity:high  Ok Locus, PT

## 2022-11-30 PROBLEM — K56.7 ILEUS (HCC): Status: ACTIVE | Noted: 2022-01-01

## 2022-11-30 NOTE — PROGRESS NOTES
Progress Note - Geriatric Medicine   Nel Martins 80 y o  female MRN: [id number]  Unit/Bed#: E4 -01 Encounter: [id number]      Assessment/Plan:  · Patient has documented history of mild cognitive impairment   · Was evaluated by geriatrics at [facility] on 2/5/2022 and scored a 29/30 on her MMSE at that time  · No further cognitive testing noted on chart review   · No TSH or vitamin B12 levels noted in epic   · Would recommend checking these levels   · No imaging of the head noted in epic   · No MOCA noted in epic   · Would recommend OT complete MOCA to obtain new baseline   · Patient is alert and oriented to person and place at baseline   · Is currently oriented to person and place   · Staff notes patient was sundowning last night  · Was attempting to remove her telemetry and unhook herself from lines attached to her  · Was not verbally or physically aggressive   · Patient is pleasant, calm, and cooperative on exam today   · Can consider starting Seroquel 12 5 mg in the evening if sundowning worsens  · Keep mentally, physically, and socially active     Delirium   • Current mentation: alert and oriented to person and place   • Patient is at high risk secondary to age, history of mild cognitive impairment, and hospitalization   • Maintain delirium precautions   · Provide redirection, reorientation, and distraction techniques  · Maintain fall and safety precautions   · Assist with ADLs/IADLs  · Avoid deliriogenic medications such as tramadol, benzodiazepines, anticholinergics, benadryl  · Treat pain using geriatric pain protocol   · Encourage oral hydration and nutrition   · Monitor for constipation and urinary retention   · Implement sleep hygiene and limit night time interuptions   · Maintain sleep-wake cycle   · Encourage early and frequent mobilization   · Encourage participation in group activities  • Most recent EKG on 11/28/2022 revealed a QTc interval of 432  • Recommend using Zyprexa 2 5 mg IM for acute behaviors  • Would avoid benzodiazepines such as Ativan for acute behaviors as it can worsen delirium     Deconditioning   • Patient is at increased risk for deconditioning secondary to MI, weakness, gait dysfunction, and hospitalization   • Continue to optimize diet, hydration, and mobility for healing   · GFR 27 on labs today   · Avoid nephrotoxic medication and renal dose medication as needed  · Keep hydrated   · PO intake   · Patient with poor oral intake prior to admission   · No meal completions charted in epic   · Nursing notes patient has not been eating much and patient confirms today   · Encourage oral hydration and nutrition   · Nutrition consult placed   · Diabetes   · Not currently on any blood sugar checks  · Encourage diabetic diet   · Infection   · Patient with elevated WBC count of 31 83 on labs today   · COVID, flu, and RSV negative  · Blood cultures, C diff, and UA pending   · Empirically started on antibiotics today   · ID consulted by primary team   • Monitor for signs and symptoms of infection, dehydration, DVT, and skin breakdown    Frailty   Clinical Frail Scale: 6- Moderately Frail  · Need help with all outside activities  · Need help with stairs and bathing  · May need assistance with dressing  • Most recent albumin on 11/28/2022 noted to be 2 8  • Patient with poor appetite   • Nutrition services consulted   • Encourage protein supplementation     Ambulatory Dysfunction/Falls  • Facility noted one fall prior to admission  • Ambulates with independently with a roller walker at baseline   • PT/OT consulted   • Maintain fall and safety precautions   • Encourage adequate oral hydration and nutrition   • Out of bed as tolerated     Impaired Vision   • Patient does have vision difficulties  · Wears eyeglasses at baseline   • Recommend use of corrective lenses at all appropriate times  • Encourage adequate lighting and encourage use of assistance with ambulation  • Keep personal belongings close to avoid reaching  • Encourage appropriate footwear at all times  • Recommend large font for printed materials provided to patient    Impaired Hearing   • Patient does have impaired hearing   · Does not have hearing aids   · Is using hearing amplifier which is present for the exam   · Hearing impairment strongly correlated with depression, cognitive impairment, delirium and falls in the older adult  · Use sound amplifier  · Speak face to face  · Use clear dictation and enunciation of words    Dentition/Appetite   • Patient noted to have poor appetite prior to admission   • Has not been eating well here   • Imaging of the abdomen revealed colitis   • Nutrition services consulted   • Ensure meal consistency is appropriate for all abilities   • Consider nutrition consult   • Continue aspiration precautions     Elimination   • Patient is incontinent of bowel and bladder at baseline  · Is continent at times  • Patient denies voiding difficulties   • Last documented bowel movement today  • Current bowel regimen includes   · Colace 100 mg BID   · Senna 8 6 mg BID   • Monitor for constipation and urinary retention     Insomnia   • Patient notes no difficulty sleeping   • First line is behavioral therapy   • Avoid sedative hypnotics including benzodiazepines and benadryl  • Encourage staying awake during the day   • Encourage daytime activities and morning exercise   • Decrease or eliminate daytime naps   • Avoid caffeine especially during late afternoon and evening hours  • Establish a nighttime routine  • Implement sleep hygiene and limit nighttime interruptions  • Can consider melatonin 3 mg daily at bedtime for sleep if needed     Elevated Troponin  · Patient with elevated troponins on admission without chest pain   · Etiology unclear   · Cardiology following     Elevated proBNP  · Patient with BNP of 19,814 on admission yesterday   · Cardiology consulted and suspected diastolic HF   · Cardiology following and managing    Neutrophilic Leukocytosis  · Patient with elevated white count of 31 83 on labs today   · COVID, flu, and RSV negative   · Blood cultures, C diff, and UA pending   · Empirically started on antibiotics today   · ID consulted   ·     Subjective: The patient is being seen and evaluated today at the bedside for geriatric follow-up  She is noted to be lying in bed comfortably in no acute distress  She is alert and oriented to person and place and is able to make her needs known  She states that she is feeling okay and offers no acute complaints on exam  She denies dizziness, headaches, and vision changes  She denies chest pain, shortness of breath, and cough  She denies nausea, vomiting, constipation, and diarrhea  Staff notes that the patient did have a watery bowel movement this morning  She states that she has not been eating well  She denies any difficulty sleeping  Review of Systems   Constitutional: Positive for activity change and appetite change  Negative for chills, fatigue and fever  HENT: Positive for hearing loss (using hearing amplifier)  Eyes: Positive for visual disturbance (glasses)  Respiratory: Negative for cough, chest tightness and shortness of breath  Cardiovascular: Negative for chest pain and leg swelling  Gastrointestinal: Negative for abdominal distention, abdominal pain, constipation, diarrhea, nausea and vomiting  Genitourinary: Negative for difficulty urinating and dysuria  Musculoskeletal: Positive for gait problem  Negative for arthralgias and myalgias  Skin: Negative for color change and pallor  Neurological: Positive for weakness  Negative for dizziness and headaches  Psychiatric/Behavioral: Positive for confusion  Negative for behavioral problems and sleep disturbance  The patient is not nervous/anxious  Objective:     Vitals: Blood pressure 162/76, pulse 78, temperature 98 3 °F (36 8 °C), temperature source Temporal, resp   rate 20, height 5' 2" (1 575 m), weight 82 1 kg (181 lb), SpO2 95 %  ,Body mass index is 33 11 kg/m²  Intake/Output Summary (Last 24 hours) at 11/30/2022 1255  Last data filed at 11/30/2022 0644  Gross per 24 hour   Intake 1000 ml   Output --   Net 1000 ml       Current Medications: Reviewed    Physical Exam:   Physical Exam  Vitals reviewed  Constitutional:       Appearance: Normal appearance  HENT:      Head: Normocephalic  Mouth/Throat:      Mouth: Mucous membranes are dry  Eyes:      General: No scleral icterus  Conjunctiva/sclera: Conjunctivae normal    Cardiovascular:      Rate and Rhythm: Normal rate and regular rhythm  Heart sounds: No murmur heard  Pulmonary:      Effort: Pulmonary effort is normal  No respiratory distress  Breath sounds: Normal breath sounds  Abdominal:      General: Bowel sounds are normal  There is no distension  Palpations: Abdomen is soft  Tenderness: There is no abdominal tenderness  Musculoskeletal:         General: No swelling or tenderness  Skin:     General: Skin is warm and dry  Neurological:      General: No focal deficit present  Mental Status: She is alert  Mental status is at baseline  She is disoriented  Motor: Weakness present  Gait: Gait abnormal       Comments: Oriented to person and place          Invasive Devices     Peripheral Intravenous Line  Duration           Peripheral IV 11/28/22 Left Antecubital 1 day    Peripheral IV 11/29/22 Dorsal (posterior); Left Forearm 1 day                Lab, Imaging and other studies: I have personally reviewed pertinent reports

## 2022-11-30 NOTE — PLAN OF CARE
Problem: MOBILITY - ADULT  Goal: Maintain or return to baseline ADL function  Description: INTERVENTIONS:  -  Assess patient's ability to carry out ADLs; assess patient's baseline for ADL function and identify physical deficits which impact ability to perform ADLs (bathing, care of mouth/teeth, toileting, grooming, dressing, etc )  - Assess/evaluate cause of self-care deficits   - Assess range of motion  - Assess patient's mobility; develop plan if impaired  - Assess patient's need for assistive devices and provide as appropriate  - Encourage maximum independence but intervene and supervise when necessary  - Involve family in performance of ADLs  - Assess for home care needs following discharge   - Consider OT consult to assist with ADL evaluation and planning for discharge  - Provide patient education as appropriate  Outcome: Progressing  Goal: Maintains/Returns to pre admission functional level  Description: INTERVENTIONS:  - Perform BMAT or MOVE assessment daily    - Set and communicate daily mobility goal to care team and patient/family/caregiver  - Collaborate with rehabilitation services on mobility goals if consulted  - Perform Range of Motion 3 times a day  - Reposition patient every 2 hours    - Dangle patient 3 times a day  - Stand patient 3 times a day  - Ambulate patient 3 times a day  - Out of bed to chair 3 times a day   - Out of bed for meals 3 times a day  - Out of bed for toileting  - Record patient progress and toleration of activity level   Outcome: Progressing     Problem: Potential for Falls  Goal: Patient will remain free of falls  Description: INTERVENTIONS:  - Educate patient/family on patient safety including physical limitations  - Instruct patient to call for assistance with activity   - Consult OT/PT to assist with strengthening/mobility   - Keep Call bell within reach  - Keep bed low and locked with side rails adjusted as appropriate  - Keep care items and personal belongings within reach  - Initiate and maintain comfort rounds  - Make Fall Risk Sign visible to staff  - Offer Toileting every 2 Hours, in advance of need  - Initiate/Maintain bed alarm  - Obtain necessary fall risk management equipment: alarms  - Apply yellow socks and bracelet for high fall risk patients  - Consider moving patient to room near nurses station  Outcome: Progressing     Problem: PAIN - ADULT  Goal: Verbalizes/displays adequate comfort level or baseline comfort level  Description: Interventions:  - Encourage patient to monitor pain and request assistance  - Assess pain using appropriate pain scale  - Administer analgesics based on type and severity of pain and evaluate response  - Implement non-pharmacological measures as appropriate and evaluate response  - Consider cultural and social influences on pain and pain management  - Notify physician/advanced practitioner if interventions unsuccessful or patient reports new pain  Outcome: Progressing     Problem: INFECTION - ADULT  Goal: Absence or prevention of progression during hospitalization  Description: INTERVENTIONS:  - Assess and monitor for signs and symptoms of infection  - Monitor lab/diagnostic results  - Monitor all insertion sites, i e  indwelling lines, tubes, and drains  - Monitor endotracheal if appropriate and nasal secretions for changes in amount and color  - [place] appropriate cooling/warming therapies per order  - Administer medications as ordered  - Instruct and encourage patient and family to use good hand hygiene technique  - Identify and instruct in appropriate isolation precautions for identified infection/condition  Outcome: Progressing  Goal: Absence of fever/infection during neutropenic period  Description: INTERVENTIONS:  - Monitor WBC    Outcome: Progressing     Problem: SAFETY ADULT  Goal: Maintain or return to baseline ADL function  Description: INTERVENTIONS:  -  Assess patient's ability to carry out ADLs; assess patient's baseline for ADL function and identify physical deficits which impact ability to perform ADLs (bathing, care of mouth/teeth, toileting, grooming, dressing, etc )  - Assess/evaluate cause of self-care deficits   - Assess range of motion  - Assess patient's mobility; develop plan if impaired  - Assess patient's need for assistive devices and provide as appropriate  - Encourage maximum independence but intervene and supervise when necessary  - Involve family in performance of ADLs  - Assess for home care needs following discharge   - Consider OT consult to assist with ADL evaluation and planning for discharge  - Provide patient education as appropriate  Outcome: Progressing  Goal: Maintains/Returns to pre admission functional level  Description: INTERVENTIONS:  - Perform BMAT or MOVE assessment daily    - Set and communicate daily mobility goal to care team and patient/family/caregiver  - Collaborate with rehabilitation services on mobility goals if consulted  - Perform Range of Motion 3 times a day  - Reposition patient every 2 hours    - Dangle patient 3 times a day  - Stand patient 3 times a day  - Ambulate patient 3 times a day  - Out of bed to chair 3 times a day   - Out of bed for meals 3 times a day  - Out of bed for toileting  - Record patient progress and toleration of activity level   Outcome: Progressing  Goal: Patient will remain free of falls  Description: INTERVENTIONS:  - Educate patient/family on patient safety including physical limitations  - Instruct patient to call for assistance with activity   - Consult OT/PT to assist with strengthening/mobility   - Keep Call bell within reach  - Keep bed low and locked with side rails adjusted as appropriate  - Keep care items and personal belongings within reach  - Initiate and maintain comfort rounds  - Make Fall Risk Sign visible to staff  - Offer Toileting every 2 Hours, in advance of need  - Initiate/Maintain bed alarm  - Obtain necessary fall risk management equipment: alarms  - Apply yellow socks and bracelet for high fall risk patients  - Consider moving patient to room near nurses station  Outcome: Progressing     Problem: DISCHARGE PLANNING  Goal: Discharge to home or other facility with appropriate resources  Description: INTERVENTIONS:  - Identify barriers to discharge w/patient and caregiver  - Arrange for needed discharge resources and transportation as appropriate  - Identify discharge learning needs (meds, wound care, etc )  - Arrange for interpretive services to assist at discharge as needed  - Refer to Case Management Department for coordinating discharge planning if the patient needs post-hospital services based on physician/advanced practitioner order or complex needs related to functional status, cognitive ability, or social support system  Outcome: Progressing     Problem: Knowledge Deficit  Goal: Patient/family/caregiver demonstrates understanding of disease process, treatment plan, medications, and discharge instructions  Description: Complete learning assessment and assess knowledge base    Interventions:  - Provide teaching at level of understanding  - Provide teaching via preferred learning methods  Outcome: Progressing     Problem: Prexisting or High Potential for Compromised Skin Integrity  Goal: Skin integrity is maintained or improved  Description: INTERVENTIONS:  - Identify patients at risk for skin breakdown  - Assess and monitor skin integrity  - Assess and monitor nutrition and hydration status  - Monitor labs   - Assess for incontinence   - Turn and reposition patient  - Assist with mobility/ambulation  - Relieve pressure over bony prominences  - Avoid friction and shearing  - Provide appropriate hygiene as needed including keeping skin clean and dry  - Evaluate need for skin moisturizer/barrier cream  - Collaborate with interdisciplinary team   - Patient/family teaching  - Consider wound care consult   Outcome: Progressing     Problem: CARDIOVASCULAR - ADULT  Goal: Maintains optimal cardiac output and hemodynamic stability  Description: INTERVENTIONS:  - Monitor I/O, vital signs and rhythm  - Monitor for S/S and trends of decreased cardiac output  - Administer and titrate ordered vasoactive medications to optimize hemodynamic stability  - Assess quality of pulses, skin color and temperature  - Assess for signs of decreased coronary artery perfusion  - Instruct patient to report change in severity of symptoms  Outcome: Progressing

## 2022-11-30 NOTE — PROGRESS NOTES
Progress Note - Cardiology   Roberto Burciaga 80 y o  female MRN: [id number]  Unit/Bed#: E4 -01 Encounter: [id number]      Assessment/Recommendations/Discussion:   1  Elevated troponin without chest pain  2  Elevated proBNP, suspected diastolic heart failure  3  Acute kidney injury on chronic kidney disease stage 3  4  Leukocytosis  5  Hypertension  6  Hyperlipidemia  7  Type 2 diabetes  8  Significant electrolyte derangement with hypokalemia and hypomagnesemia  9  Dementia which appears to be significant  10  Severe leukocytosis with suspected sepsis    PLAN  • She denies any chest pain or shortness of breath  troponin did trend down, peaked at 1437, now 802  • Still with significant leukocytosis , on Rocephin and Flagyl currently  • Potassium was repleted but is still low, 3 0 today, would continue with aggressive electrolyte replacement  • Suspect troponin elevation from probable underlying CAD on top of infectious disease process with severe leukocytosis of now 32,000  • Given her, what appears to be advanced dementia, and lack of angina symptoms would not pursue invasive coronary evaluation  • Treat sepsis per primary team     Subjective:   HPI  She denies any chest pain or shortness of breath, her history is very limited due to her mental status/dementia      Review of Systems: As noted in HPI  Rest of ROS is negative      Vitals:   /76 (BP Location: Right arm)   Pulse 78   Temp 98 3 °F (36 8 °C) (Temporal)   Resp 20   Wt 82 1 kg (181 lb)   SpO2 95%   I/O       11/28 0701  11/29 0700 11/29 0701  11/30 0700 11/30 0701  12/01 0700    I V  (mL/kg)  1000 (12 2)     IV Piggyback 1000      Total Intake(mL/kg) 1000 (12 2) 1000 (12 2)     Net +1000 +1000            Unmeasured Urine Occurrence 2 x          Weight (last 2 days)     Date/Time Weight    11/28/22 2231 82 1 (181)          Physical Exam   Constitutional: awake, she is not oriented to time or place  Head: Normocephalic, without obvious abnormality, atraumatic  Eyes: conjunctivae clear and moist  Sclera anicteric  No xanthelasmas  Pupils equal bilaterally  Extraocular motions are full  Ear nose mouth and throat: ears are symmetrical bilaterally, hearing appears to be equal bilaterally, no nasal discharge or epistaxis, oropharynx is clear with moist mucous membranes  Neck:  Trachea is midline, neck is supple, no thyromegaly or significant lymphadenopathy, there is full range of motion  Lungs: clear to auscultation bilaterally, no wheezes, no rales, no rhonchi, no accessory muscle use, breathing is nonlabored  Heart: regular rate and rhythm, S1, S2 normal, no murmur, no click, no rub and no gallop, no lower extremity edema  Abdomen:  Obese, soft, non-tender; bowel sounds normal; no masses,  no organomegaly  Skin: Skin is warm, dry, intact  No obvious rashes or lesions on exposed extremities  Nail beds are pink with no cyanosis or clubbing  TELEMETRY:   Lab Results:  Results from last 7 days   Lab Units 11/30/22  0439   WBC Thousand/uL 31 83*   HEMOGLOBIN g/dL 13 7   HEMATOCRIT % 41 0   PLATELETS Thousands/uL 262     Results from last 7 days   Lab Units 11/30/22  0439 11/29/22  0552 11/28/22  1401   POTASSIUM mmol/L 3 0*   < > 3 0*   CHLORIDE mmol/L 103   < > 97   CO2 mmol/L 20*   < > 24   BUN mg/dL 55*   < > 61*   CREATININE mg/dL 1 62*   < > 1 99*   CALCIUM mg/dL 8 3   < > 8 8   ALK PHOS U/L  --   --  100   ALT U/L  --   --  31   AST U/L  --   --  53*    < > = values in this interval not displayed       Results from last 7 days   Lab Units 11/30/22  0439   POTASSIUM mmol/L 3 0*   CHLORIDE mmol/L 103   CO2 mmol/L 20*   BUN mg/dL 55*   CREATININE mg/dL 1 62*   CALCIUM mg/dL 8 3           Medications:    Current Facility-Administered Medications:   •  aspirin chewable tablet 81 mg, 81 mg, Oral, Daily, Leonid Ballard DO, 81 mg at 11/30/22 7132  •  atorvastatin (LIPITOR) tablet 40 mg, 40 mg, Oral, Daily With Kimberly Ballard DO, 40 mg at 11/29/22 1712  •  cefTRIAXone (ROCEPHIN) 1,000 mg in dextrose 5 % 50 mL IVPB, 1,000 mg, Intravenous, Q24H, Reyes Luna MD, Last Rate: 100 mL/hr at 11/30/22 1028, 1,000 mg at 11/30/22 1028  •  docusate sodium (COLACE) capsule 100 mg, 100 mg, Oral, BID, TAN Griffiths, 100 mg at 11/30/22 6712  •  ezetimibe (ZETIA) tablet 10 mg, 10 mg, Oral, Daily, Leonid S Prechtel, DO, 10 mg at 11/30/22 1157  •  heparin (porcine) 25,000 units in 0 45% NaCl 250 mL infusion (premix), 3-20 Units/kg/hr (Order-Specific), Intravenous, Titrated, Leonid S Prechtel, DO, Last Rate: 14 4 mL/hr at 11/30/22 0542, 18 Units/kg/hr at 11/30/22 0542  •  heparin (porcine) injection 2,000 Units, 2,000 Units, Intravenous, Q1H PRN, Sean Grier Prechtel, DO  •  heparin (porcine) injection 4,000 Units, 4,000 Units, Intravenous, Q1H PRN, Ted Jesus, DO, 4,000 Units at 11/29/22 2212  •  metoprolol tartrate (LOPRESSOR) tablet 25 mg, 25 mg, Oral, Q12H [address], Leonid S Prechtel, DO, 25 mg at 11/30/22 0471  •  metroNIDAZOLE (FLAGYL) IVPB (premix) 500 mg 100 mL, 500 mg, Intravenous, Q8H, Reyes Luna MD, Last Rate: 200 mL/hr at 11/30/22 1033, 500 mg at 11/30/22 1033  •  nitroglycerin (NITROSTAT) SL tablet 0 4 mg, 0 4 mg, Sublingual, Q5 Min PRN, Leonid S Prechtel, DO  •  ondansetron (ZOFRAN) injection 4 mg, 4 mg, Intravenous, Q6H PRN, Leonid S Prechtel, DO  •  potassium chloride (K-DUR,KLOR-CON) CR tablet 40 mEq, 40 mEq, Oral, BID, Reyes Luna MD, 40 mEq at 11/30/22 1463  •  senna (SENOKOT) tablet 8 6 mg, 1 tablet, Oral, BID, TAN Griffiths, 8 6 mg at 11/30/22 8356  •  sodium chloride 0 9 % with KCl 20 mEq/L infusion (premix), 50 mL/hr, Intravenous, Continuous, Reyes Luna MD, Last Rate: 50 mL/hr at 11/30/22 0644, 50 mL/hr at 11/30/22 0644  •  timolol (TIMOPTIC) 0 5 % ophthalmic solution 1 drop, 1 drop, Both Eyes, BID, Leonid Ballard DO, 1 drop at 11/30/22 4853    This note was completed in part utilizing M-Modal Fluency Direct Software  Grammatical errors, random word insertions, spelling mistakes, and incomplete sentences may be an occasional consequence of this system secondary to software limitations, ambient noise, and hardware issues  If you have any questions or concerns about the content, text, or information contained within the body of this dictation, please contact the provider for clarification      Meño Wolff DO, [facility]  11/30/2022 11:08 AM

## 2022-11-30 NOTE — PLAN OF CARE
Problem: Potential for Falls  Goal: Patient will remain free of falls  Description: INTERVENTIONS:  - Educate patient/family on patient safety including physical limitations  - Instruct patient to call for assistance with activity   - Consult OT/PT to assist with strengthening/mobility   - Keep Call bell within reach  - Keep bed low and locked with side rails adjusted as appropriate  - Keep care items and personal belongings within reach  - Initiate and maintain comfort rounds  - Make Fall Risk Sign visible to staff  - Offer Toileting every 2 Hours, in advance of need  - Initiate/Maintain bed alarm  - Obtain necessary fall risk management equipment:   - Apply yellow socks and bracelet for high fall risk patients  - Consider moving patient to room near nurses station  Outcome: Progressing      Problem: CARDIOVASCULAR - ADULT  Goal: Maintains optimal cardiac output and hemodynamic stability  Description: INTERVENTIONS:  - Monitor I/O, vital signs and rhythm  - Monitor for S/S and trends of decreased cardiac output  - Administer and titrate ordered vasoactive medications to optimize hemodynamic stability  - Assess quality of pulses, skin color and temperature  - Assess for signs of decreased coronary artery perfusion  - Instruct patient to report change in severity of symptoms  Outcome: Progressing

## 2022-11-30 NOTE — NURSING NOTE
At 0300 pt wanted to use the bathroom  This nurse along with PCA assisted pt to the bathroom  Pt ambulates using walker  Before pt could reach the toilet seat, patient lost her balance  Pt assisted to the the floor  No injuries noted  Pt did not hit her head  Pt assisted back to the toilet and back to the bed assist X 3, denies any pain  VSS  Hospitalist notified  No new orders  Pt currently resting in bed   Will continue to monitor

## 2022-11-30 NOTE — PROGRESS NOTES
The metronidazole has / have been converted to Oral per [facility] IV-to-PO Auto-Conversion Protocol for Adults as approved by the Pharmacy and Therapeutics Committee  The patient met all eligible criteria:  1) Age >/= 25years old   2) Received at least one dose of the IV form   3) Receiving at least one other scheduled oral/enteral medication   4) Tolerating an oral/enteral diet   and did not have any exclusions:   1) Critical care patient   2) Active GI bleed (IF assessing H2RAs or PPIs)   3) Continuous tube feeding (IF assessing cipro, doxycycline, levofloxacin, minocycline, rifampin, or voriconazole)   4) Receiving PO vancomycin (IF assessing metronidazole)   5) Persistent nausea and/or vomiting   6) Ileus or gastrointestinal obstruction   7) Mary Lou/nasogastric tube set for continuous suction   8) Specific order not to automatically convert to PO (in the order's comments or if discussed in the most recent Infectious Disease or primary team's progress notes)       Confirmed ok to switch with GARCIA Das

## 2022-11-30 NOTE — PROGRESS NOTES
[address]  PROGRESS NOTE- aHkeem Junior 11/27/1932, 80 y o  female MRN: [id number]  Unit/Bed#: E4 -01 Encounter: [id number]  Primary Care Provider: Taylor Jurado MD   Date and time admitted to hospital: 11/28/2022  1:34 PM     * Elevated troponin  Assessment & Plan  • Without chest pain  • Etiology unclear  Type 1 versus type 2 non-STEMI in the setting of CHF and decreased clearance from CKD  • Was placed on heparin, aspirin, and Lipitor last night  • Await further recommendations per Cardiology      Elevated brain natriuretic peptide (BNP) level  Assessment & Plan  • Elevated BNP associated bilateral pleural effusion suggesting LV dysfunction  • Await formal echocardiogram  • Continue metoprolol 25 p o  B i d      Hypokalemia  Assessment & Plan  Level critically low at 2 2  Start oral and IV potassium supplementation     DM[facility]di[facility]  Assessment & Plan        Lab Results   Component Value Date     HGBA1C 6 4 (H) 08/28/2020      DM2 diet controlled prior to admission  With stress-induced hyperglycemia  Continue diabetic diet  Place on sliding scale        HTN (hypertension)  Assessment & Plan  Hold HCTZ  Continue metoprolol 25 q 12        VTE Pharmacologic Prophylaxis:   Pharmacologic: Heparin Drip  Mechanical VTE Prophylaxis in Place: No     Patient Centered Rounds: Discussed with her nurse     Discussions with Specialists or Other Care Team Provider:  History and physical reviewed     Education and Discussions with Family / Patient:  Spoke with ELSIE Orona     Time Spent for Care: 25 minutes    More than 50% of total time spent on counseling and coordination of care as described above      Current Length of Stay: 0 day(s)     Current Patient Status: Observation   Certification Statement: The patient, admitted on an observation basis, will now require > 2 midnight hospital stay due to Elevated troponin and elevated BNP     Discharge Plan:  Not ready for discharge  Will switch to inpatient     Code Status: Level 1 - Full Code     Subjective:   Seen and examined during rounds  Denies chest pain   Her breakfast was in front of her but she just had no appetite     Objective:      Vitals:   Temp (24hrs), Av 8 °F (36 6 °C), Min:97 5 °F (36 4 °C), Max:98 3 °F (36 8 °C)     Temp:  [97 5 °F (36 4 °C)-98 3 °F (36 8 °C)] 97 8 °F (36 6 °C)  HR:  [71-81] 81  Resp:  [18-22] 22  BP: (122-185)/(65-91) 122/91  SpO2:  [95 %-97 %] 96 %  There is no height or weight on file to calculate BMI       Input and Output Summary (last 24 hours):         Intake/Output Summary (Last 24 hours) at 2022 1016  Last data filed at 2022 1740      Gross per 24 hour   Intake 1000 ml   Output --   Net 1000 ml         Physical Exam:      Physical Exam  Constitutional:       Appearance: She is not ill-appearing or diaphoretic  HENT:      Head: Normocephalic and atraumatic  Ears:      Comments: Very poor hearing  Hearing is worse on the left compared to the right     Nose: No congestion or rhinorrhea  Eyes:      General: No scleral icterus  Cardiovascular:      Rate and Rhythm: Normal rate and regular rhythm  Pulmonary:      Effort: Pulmonary effort is normal  No respiratory distress  Breath sounds: No wheezing or rales  Abdominal:      General: Abdomen is flat  There is no distension  Palpations: Abdomen is soft  Tenderness: There is no abdominal tenderness  Musculoskeletal:      Cervical back: Neck supple  Right lower leg: No edema  Left lower leg: No edema  Skin:     General: Skin is warm and dry  Neurological:      Mental Status: She is alert  She is disoriented     Psychiatric:         Mood and Affect: Mood normal          Behavior: Behavior normal       Additional Data:      Labs:            Results from last 7 days   Lab Units 22  0552 22  2259 22  1401   WBC Thousand/uL 28 28*   < > 28 89*   HEMOGLOBIN g/dL 13 7   < > 14 5   HEMATOCRIT % 41 0   < > 43 2   PLATELETS Thousands/uL 226   < > 218   NEUTROS PCT %  --   --  90*   LYMPHS PCT %  --   --  2*   MONOS PCT %  --   --  7   EOS PCT %  --   --  0    < > = values in this interval not displayed             Results from last 7 days   Lab Units 11/29/22  0552 11/28/22  1401   SODIUM mmol/L 139 134*   POTASSIUM mmol/L 2 2* 3 0*   CHLORIDE mmol/L 101 97   CO2 mmol/L 23 24   BUN mg/dL 58* 61*   CREATININE mg/dL 1 71* 1 99*   ANION GAP mmol/L 15* 13   CALCIUM mg/dL 8 6 8 8   ALBUMIN g/dL  --  2 8*   TOTAL BILIRUBIN mg/dL  --  1 49*   ALK PHOS U/L  --  100   ALT U/L  --  31   AST U/L  --  53*   GLUCOSE RANDOM mg/dL 231* 313*           Results from last 7 days   Lab Units 11/28/22  2318   INR   1 33*      * I Have Reviewed All Lab Data Listed Above  * Additional Pertinent Lab Tests Reviewed:  All Labs Within Last 24 Hours Reviewed     Imaging:     Imaging Reports Reviewed Today Include:  Chest x-ray        Recent Cultures (last 7 days):             Last 24 Hours Medication List:            Current Facility-Administered Medications   Medication Dose Route Frequency Provider Last Rate   • aspirin  81 mg Oral Daily Leonid S Prechtel, DO     • atorvastatin  40 mg Oral Daily With [employer] S Prechtel, DO     • ezetimibe  10 mg Oral Daily Leonid S Prechtel, DO     • heparin (porcine)  3-20 Units/kg/hr (Order-Specific) Intravenous Titrated Jeananne Gulling Prechtel, DO 12 Units/kg/hr (11/28/22 2331)   • heparin (porcine)  2,000 Units Intravenous Q1H PRN Jejede Gulling Prechtel, DO     • heparin (porcine)  4,000 Units Intravenous Q1H PRN Leonid S Prechtel, DO     • magnesium sulfate  2 g Intravenous Once TAN Julien     • metoprolol tartrate  25 mg Oral Q12H [facility] Leonid S Prechtel, DO     • nitroglycerin  0 4 mg Sublingual Q5 Min PRN Kene Gujose alfredoing Prechtel, DO     • ondansetron  4 mg Intravenous Q6H PRN Gagandeep Gujose alfredoing Prechtel, DO     • potassium chloride  40 mEq Oral BID Kenroy Oglesby, MD     • potassium chloride  20 mEq Intravenous Once TAN Julien 20 mEq (11/29/22 0324)   • sodium chloride 0 9 % with KCl 20 mEq/L  50 mL/hr Intravenous Continuous Yousuf Gonzalez MD 50 mL/hr (11/29/22 2322)   • timolol  1 drop Both Eyes BID Rosalinda Kerr DO           Today, Patient Was Seen By: Yousuf Gonzalez MD     ** Please Note: Dictation voice to text software may have been used in the creation of this document   **

## 2022-11-30 NOTE — ASSESSMENT & PLAN NOTE
· On CT due to severe hypokalemia  · Potassium is improving  · Treat colitis  · Serial exam  · Place on diabetic clears for now

## 2022-11-30 NOTE — ASSESSMENT & PLAN NOTE
Lab Results   Component Value Date    EGFR 27 11/30/2022    EGFR 25 11/29/2022    EGFR 21 11/28/2022    CREATININE 1 62 (H) 11/30/2022    CREATININE 1 71 (H) 11/29/2022    CREATININE 1 99 (H) 11/28/2022   Creatinine is improving  Check BMP in a m    Avoid hypotension  Avoid nephrotoxic agent

## 2022-11-30 NOTE — PROGRESS NOTES
[address] [facility]  Progress Note - Ilya Chester 11/27/1932, 80 y o  female MRN: [id number]  Unit/Bed#: E4 -01 Encounter: [id number]  Primary Care Provider: Maegan Kelley MD   Date and time admitted to hospital: 11/28/2022  1:34 PM    * Elevated troponin  Assessment & Plan  · Without chest pain  · Etiology unclear  Suspect type 2 non-STEMI in the setting of CHF and decreased clearance from CKD  · Cardiology evaluation and recommendations reviewed  · Conservative treatment with heparin drip, aspirin, metoprolol and Lipitor initiated  · Anticipate the heparin drip will be discontinued today    Elevated brain natriuretic peptide (BNP) level  Assessment & Plan  · Elevated BNP associated bilateral pleural effusion suggesting LV dysfunction  · Suspect underlying congestive heart failure  Systolic versus diastolic  · Await formal echocardiogram  · Continue metoprolol 25 p o  B i d  Neutrophilic leukocytosis  Assessment & Plan  Significant neutrophilic leukocytosis  Not toxic or ill-appearing  CT with possible colitis    She is mildly tender to palpation in the right lower quadrant and presented with poor appetite  Check blood cultures  Check UA for complete  Check C diff PCR  COVID flu and RSV negative  Start empiric ceftriaxone and Flagyl  Consult ID given significant leukocytosis not proportional to physical exam and CT findings        HTN (hypertension)  Assessment & Plan  Hold HCTZ  Continue metoprolol 25 q 12    Hypokalemia  Assessment & Plan  Continue oral potassium supplementation    DM2 (diabetes mellitus, type 2) ([facility])  Assessment & Plan  Lab Results   Component Value Date    HGBA1C 6 4 (H) 08/28/2020     DM2 diet controlled prior to admission  With stress-induced hyperglycemia  Place on diabetic clear diet for now due to new finding of ileus and possible colitis  Place on sliding scale      Ileus ([address] )  Assessment & Plan  · On CT due to severe hypokalemia  · Potassium is improving  · Treat colitis  · Serial exam  · Place on diabetic clears for now    CKD (chronic kidney disease)  Assessment & Plan  Lab Results   Component Value Date    EGFR 27 2022    EGFR 25 2022    EGFR 21 2022    CREATININE 1 62 (H) 2022    CREATININE 1 71 (H) 2022    CREATININE 1 99 (H) 2022   Creatinine is improving  Check BMP in a m  Avoid hypotension  Avoid nephrotoxic agent      VTE Pharmacologic Prophylaxis:   Pharmacologic: Heparin Drip  Mechanical VTE Prophylaxis in Place: Yes    Patient Centered Rounds: I have performed bedside rounds with nursing staff today  Education and Discussions with Family / Patient: Spoke with nephew and gave update    Time Spent for Care: 30 minutes  More than 50% of total time spent on counseling and coordination of care as described above  Current Length of Stay: 1 day(s)    Current Patient Status: Inpatient   Certification Statement: The patient will continue to require additional inpatient hospital stay due to Leukocytosis    Discharge Plan: To be determined    Code Status: Level 1 - Full Code      Subjective:   Seen and examined during rounds with her nurse  According to her nurse this morning she complained of abdominal pain and had 1 episode of diarrhea  At the time of my examination the patient denied any abdominal pain except for when her right lower quadrant was palpated  She denied any chest pain  She had no fever or chills  She had no dysuria    Objective:     Vitals:   Temp (24hrs), Av 4 °F (36 9 °C), Min:97 8 °F (36 6 °C), Max:99 °F (37 2 °C)    Temp:  [97 8 °F (36 6 °C)-99 °F (37 2 °C)] 97 8 °F (36 6 °C)  HR:  [67-92] 92  Resp:  [18-22] 22  BP: (126-163)/() 147/65  SpO2:  [92 %-97 %] 96 %  There is no height or weight on file to calculate BMI  Input and Output Summary (last 24 hours):        Intake/Output Summary (Last 24 hours) at 2022 1030  Last data filed at 2022 0644  Gross per 24 hour Intake 1000 ml   Output --   Net 1000 ml       Physical Exam:     Physical Exam  Vitals reviewed  Constitutional:       General: She is not in acute distress  Appearance: She is obese  She is not ill-appearing or toxic-appearing  HENT:      Head: Normocephalic and atraumatic  Mouth/Throat:      Pharynx: No oropharyngeal exudate or posterior oropharyngeal erythema  Eyes:      General: No scleral icterus  Cardiovascular:      Rate and Rhythm: Normal rate and regular rhythm  Pulmonary:      Effort: Pulmonary effort is normal  No respiratory distress  Breath sounds: No wheezing or rales  Abdominal:      General: Abdomen is flat  There is no distension  Palpations: Abdomen is soft  Tenderness: There is no guarding  Comments: Mild right lower quadrant tenderness on direct palpation   Musculoskeletal:      Cervical back: Neck supple  Skin:     General: Skin is warm and dry  Coloration: Skin is not jaundiced or pale  Neurological:      Comments: Oriented to person  Poor hearing   Psychiatric:         Mood and Affect: Mood normal          Behavior: Behavior normal        Additional Data:     Labs:    Results from last 7 days   Lab Units 11/30/22  0439 11/28/22  2259 11/28/22  1401   WBC Thousand/uL 31 83*   < > 28 89*   HEMOGLOBIN g/dL 13 7   < > 14 5   HEMATOCRIT % 41 0   < > 43 2   PLATELETS Thousands/uL 262   < > 218   BANDS PCT % 6  --   --    NEUTROS PCT %  --   --  90*   LYMPHS PCT %  --   --  2*   LYMPHO PCT % 1*  --   --    MONOS PCT %  --   --  7   MONO PCT % 7  --   --    EOS PCT % 0  --  0    < > = values in this interval not displayed       Results from last 7 days   Lab Units 11/30/22  0439 11/29/22  0552 11/28/22  1401   SODIUM mmol/L 138   < > 134*   POTASSIUM mmol/L 3 0*   < > 3 0*   CHLORIDE mmol/L 103   < > 97   CO2 mmol/L 20*   < > 24   BUN mg/dL 55*   < > 61*   CREATININE mg/dL 1 62*   < > 1 99*   ANION GAP mmol/L 15*   < > 13   CALCIUM mg/dL 8 3   < > 8 8 ALBUMIN g/dL  --   --  2 8*   TOTAL BILIRUBIN mg/dL  --   --  1 49*   ALK PHOS U/L  --   --  100   ALT U/L  --   --  31   AST U/L  --   --  53*   GLUCOSE RANDOM mg/dL 267*   < > 313*    < > = values in this interval not displayed  Results from last 7 days   Lab Units 11/28/22  2318   INR  1 33*                       * I Have Reviewed All Lab Data Listed Above  * Additional Pertinent Lab Tests Reviewed:  All Labs Within Last 24 Hours Reviewed    Imaging:    Imaging Reports Reviewed Today Include:  CT abdomen    Recent Cultures (last 7 days):           Last 24 Hours Medication List:   Current Facility-Administered Medications   Medication Dose Route Frequency Provider Last Rate   • aspirin  81 mg Oral Daily Leonid S Prechtel, DO     • atorvastatin  40 mg Oral Daily With [employer] S Prechtel, DO     • cefTRIAXone  1,000 mg Intravenous Q24H Lora Bradshaw MD     • docusate sodium  100 mg Oral BID TAN Cintron     • ezetimibe  10 mg Oral Daily Leonid S Prechtel, DO     • heparin (porcine)  3-20 Units/kg/hr (Order-Specific) Intravenous Titrated Ashleighmargarito Faust Prechtel, DO 18 Units/kg/hr (11/30/22 0542)   • heparin (porcine)  2,000 Units Intravenous Q1H PRN Ashleigh Faust Prechtel, DO     • heparin (porcine)  4,000 Units Intravenous Q1H PRN Ashleigh Faust Prechtel, DO     • metoprolol tartrate  25 mg Oral Q12H [address] Leonid S Prechtel, DO     • metroNIDAZOLE  500 mg Intravenous Q8H Lora Bradshaw MD     • nitroglycerin  0 4 mg Sublingual Q5 Min PRN Ashleigh Faust Prechtel, DO     • ondansetron  4 mg Intravenous Q6H PRN Ashleigh Duboisel, DO     • potassium chloride  40 mEq Oral BID Lora Bradshaw MD     • senna  1 tablet Oral BID TAN Cintron     • sodium chloride 0 9 % with KCl 20 mEq/L  50 mL/hr Intravenous Continuous Ebb MD Leeann 50 mL/hr (11/30/22 7657)   • timolol  1 drop Both Eyes BID Gregorio Huynh DO          Today, Patient Was Seen By: Ashely Davis Poli Jaimes MD    ** Please Note: Dictation voice to text software may have been used in the creation of this document   **

## 2022-11-30 NOTE — CONSULTS
Consultation - Infectious Disease   Hakeemalaina Junior 80 y o  female MRN: [id number]  Unit/Bed#: E4 -01 Encounter: [id number]    IMPRESSION & RECOMMENDATIONS:   1  Leukocytosis  Unclear etiology  Appears out of proportion to current GI findings  Will send stool studies as below  Consider possibility of translocation of gut bacteremia related to ileus  Blood cultures are pending  She denies respiratory symptoms and I personally reviewed her CXR which showed no acute infectious cardiopulmonary findings  RLE with small superficial abrasion but no evidence of soft tissue infection  UA was ordered but patient denies  symptoms  Fortunately the patient remains hemodynamically stable and has been afebrile  She was given ceftriaxone and Flagyl this morning  Would hold additional systemic antibiotic for now while c diff is ruled out   -hold additional ceftriaxone and flagyl for now  -check CBCD tomorrow  -follow up blood cultures  -follow up stool studies as below  -monitor vitals    2  Diarrhea  Single episode this morning with patient denying symptom prior to admission  I personally reviewed her CT A/P which showed fluid/gas distending the colon and rectum without obstruction  Colonic diverticula are present  No bowel wall thickening seen  There is mild nonspecific fat standing adjacent to the cecum/ascending colon  Patient likely with ileus which may have been contributing to her recent appetite issues  She did receive stool softeners yesterday which I recommend holding for now  Will order stool PCR and c diff PCR with next bowel movement     -stop colace and senna for now  -check stool PCR and c diff PCR with next bowel movement  -serial GI exams  -monitor GI symptoms  -monitor stool output    3  CKD stage III  Limited medical records available to establish baseline creatinine   Creatinine during this admission has ranged from 1 62 - 1 99    -monitor creatinine  -dose adjust antibiotic for renal function as needed  -avoid nephrotoxins    4  Type 2 diabetes mellitus without long term insulin use  Patient's last HbA1c was 6 4% on 8/28/2020  Elevated blood glucose is risk factor for infection  Recommend tight glycemic control  Consider re-checking HbA1c   -consider rechecking HbA1c  -blood glucose management per primary service      5  Obesity  BMI = 33 11     6  Cognitive impairment  Patient oriented during my visit but is poor historian  Also exhibited concern for delirium and sundowning yesterday and had a fall last night  She follows outpatient with geriatric medicine and her facility medicine team  Is now consulted inpatient with geriatric medicine   -serial neuro exams  -monitor mood and mental status  -supportive care  -continue follow up with geriatric medicine    We will follow along with the patient  Above plan was discussed in detail with patient at the bedside  Above plan was discussed in detail with SLIM  HISTORY OF PRESENT ILLNESS:  Reason for Consult: neutrophilic leukocytosis  HPI: Celina Miranda is a 80y o  year old female who presented to the [place] [address] ED on 11/28/2022 due to poor appetite  Patient's family concerned she hadn't been eating a lot  Patient offered no complaints  Upon arrival to the ED the patient's temperature was 97 7  HR was 80  RR was 18  O2 saturation was 96% on room air  BP was 185/81  Her WBC count was 28 89  Creatinine was 1 99 with GFR = 21  COVID-19 PCR was negative  She completed a CXR which showed no acute infectious cardiopulmonary findings  She was found to have prolonged QT on EKG  The patient was admitted for additional medical management  After her admission she was assessed by cardiology  She received heparin drip  She completed a TTE and results are pending  Geriatric medicine was consulted  Patient was started on bowel regimen  Nutrition consult was placed  Zyprexa was recommended for her delirium   Last night patient had fall while ambulating to bathroom  She did not hit her head or lose consciousness  She reported abdominal pain and had 1 episode of diarrhea  She completed a CT of the abdomen/pelvis which showed fluid and gas distending the colon and rectum without obstruction  Diverticula are present  No colonic wall thickening noted  The patient was given ceftriaxone and flagyl  We've been asked for formal consult for neutrophilic leukocytosis  The patient has hyperlipidemia, CKD stage 3, DMII, mild cognitive disorder, memory loss, open angle glaucoma, vitamin D deficiency, and HTN  She has a history of weight loss, fall, and R thumb fracture  She is a former smoker  She has no known drug allergies  REVIEW OF SYSTEMS:  Patient reports she's having an upset stomach today  Denies vomiting but I did bring an emesis basin to her bedside just in case  She reports the pain is "in the middle" and just started this morning  She denies chills, sweats, shakes  She denies sore throat, runny nose, and congestion  She denies cough, difficulty breathing, SOB, and chest pain  She denies dysuria  She denies pain in her extremities and in her back  She denies headaches, neck stiffness, and dizziness  She says she's been a bit more tired than normal  Also reports she's not really hungry anymore  A complete 12 point system-based review of systems is otherwise negative  PAST MEDICAL HISTORY:  Past Medical History:   Diagnosis Date   • CKD (chronic kidney disease)    • Diabetes mellitus ([facility] [address] )    • Hyperlipidemia    • Hypertension      History reviewed  No pertinent surgical history      FAMILY HISTORY:  Non-contributory    SOCIAL HISTORY:  Social History   Social History     Substance and Sexual Activity   Alcohol Use Never     Social History     Substance and Sexual Activity   Drug Use Never     Social History     Tobacco Use   Smoking Status Never   Smokeless Tobacco Never     ALLERGIES:  No Known Allergies    MEDICATIONS:  All current active medications have been reviewed  ANTIBIOTICS:  Ceftriaxone 1  Flagyl 1  Antibiotics 1    PHYSICAL EXAM:  Temp:  [97 8 °F (36 6 °C)-99 °F (37 2 °C)] 98 3 °F (36 8 °C)  HR:  [70-92] 78  Resp:  [18-22] 20  BP: (126-163)/() 162/76  SpO2:  [92 %-97 %] 95 %  Temp (24hrs), Av 3 °F (36 8 °C), Min:97 8 °F (36 6 °C), Max:99 °F (37 2 °C)  Current: Temperature: 98 3 °F (36 8 °C)    Intake/Output Summary (Last 24 hours) at 2022 1218  Last data filed at 2022 1782  Gross per 24 hour   Intake 1000 ml   Output --   Net 1000 ml     General Appearance:  Appearing well, elderly, nontoxic, and in no acute distress  She appears comfortable sitting up in bed  Hard of hearing - has sound amplifier with her in bed  Head:  Normocephalic, without obvious abnormality, atraumatic  Eyes:  Conjunctiva pink and sclera anicteric, both eyes  She is wearing her glasses  Nose: Nares normal, mucosa normal, no drainage  Throat: Oropharynx moist without lesions  Neck: Supple, symmetrical, no adenopathy, no tenderness/mass/nodules  Back:   Symmetric, ROM normal but slow, no CVA tenderness  Lungs:   Clear to auscultation bilaterally, respirations unlabored on room air  Chest Wall:  No tenderness or deformity  Heart:  RRR; no murmur, rub or gallop  Abdomen:   Soft, non-tender, non-distended, positive bowel sounds throughout, somewhat hyperactive over left abdomen  Extremities: No cyanosis or clubbing, no edema  R shin with small superficial abrasion, no active drainage, no spreading erythema from site  L shin with linear scratches  Skin: No rashes or lesions noted on exposed skin  Generalized warm and dry  Lymph nodes: Cervical, supraclavicular nodes normal    Neurologic: Alert and oriented to person and "hospital", follows commands, speech is organized, symmetric facial movement   Is confused with details of the past 24 hours and why she is currently in the hospital      LABS, IMAGING, & OTHER STUDIES:  Lab Results:  I have personally reviewed pertinent labs  Results from last 7 days   Lab Units 11/30/22  0439 11/29/22  0552 11/28/22  2259   WBC Thousand/uL 31 83* 28 28* 28 45*   HEMOGLOBIN g/dL 13 7 13 7 13 9   PLATELETS Thousands/uL 262 226 193     Results from last 7 days   Lab Units 11/30/22  0439 11/29/22  0552 11/28/22  1401   POTASSIUM mmol/L 3 0*   < > 3 0*   CHLORIDE mmol/L 103   < > 97   CO2 mmol/L 20*   < > 24   BUN mg/dL 55*   < > 61*   CREATININE mg/dL 1 62*   < > 1 99*   EGFR ml/min/1 73sq m 27   < > 21   CALCIUM mg/dL 8 3   < > 8 8   AST U/L  --   --  53*   ALT U/L  --   --  31   ALK PHOS U/L  --   --  100    < > = values in this interval not displayed  Results from last 7 days   Lab Units 11/29/22  0548   MRSA CULTURE ONLY  No Methicillin Resistant Staphlyococcus aureus (MRSA) isolated     Imaging Studies:   I have personally reviewed pertinent imaging study reports and images in PACS  CT ABDOMEN PELVIS WO CONTRAST 11/29/2022 - I personally reviewed this study which showed fluid/gas distending the colon and rectum without obstruction  Colonic diverticula are present  No bowel wall thickening seen  There is mild nonspecific fat standing adjacent to the cecum/ascending colon  Other Studies:   Blood cultures are pending  I have personally reviewed pertinent reports:      11/29/2022 1033 11/29/2022 [facility] only [[id number]]    Nares from Nose    Final result [address] - copy/paste COVID Guidelines URL to browser: [email or web address]   SARS-CoV-2 assay is a Nucleic Acid Amplification assay intended for the   qualitative detection of nucleic acid from SARS-CoV-2 in nasopharyngeal   swabs  Results are for the presumptive identification of SARS-CoV-2 RNA     Positive results are indicative of infection with SARS-CoV-2, the virus   causing COVID-19, but do not rule out bacterial infection or co-infection with other viruses  Laboratories within the [place] and its   territories are required to report all positive results to the appropriate   public health authorities  Negative results do not preclude SARS-CoV-2   infection and should not be used as the sole basis for treatment or other   patient management decisions  Negative results must be combined with   clinical observations, patient history, and epidemiological information  This test has not been FDA cleared or approved  This test has been authorized by FDA under an Emergency Use Authorization   (EUA)  This test is only authorized for the duration of time the   declaration that circumstances exist justifying the authorization of the   emergency use of an in vitro diagnostic tests for detection of SARS-CoV-2   virus and/or diagnosis of COVID-19 infection under section 564(b)(1) of   the Act, 21 U  S C  741NFC-4(R)(7), unless the authorization is terminated   or revoked sooner  The test has been validated but independent review by FDA   and CLIA is pending  Test performed using [employer] Gene[employer]pert: This RT-PCR assay targets N2,   a region unique to SARS-CoV-2  A conserved region in the E-gene was chosen   for pan-Sarbecovirus detection which includes SARS-CoV-2  According to CMS-2020-01-R, this platform meets the definition of high-throughput technology      Component Value   SARS-CoV-2 Negative

## 2022-12-01 PROBLEM — I51.3 LV (LEFT VENTRICULAR) MURAL THROMBUS: Status: ACTIVE | Noted: 2022-01-01

## 2022-12-01 PROBLEM — I50.41 ACUTE COMBINED SYSTOLIC AND DIASTOLIC CONGESTIVE HEART FAILURE (HCC): Status: ACTIVE | Noted: 2022-01-01

## 2022-12-01 NOTE — PHYSICAL THERAPY NOTE
Physical Therapy Cancellation Note    PT session cancelled as patient not feeling well  Will continue to follow as able

## 2022-12-01 NOTE — ASSESSMENT & PLAN NOTE
· In the setting of congestive heart failure with low EF and diffuse wall motion abnormality  · Heparin drip restarted

## 2022-12-01 NOTE — ASSESSMENT & PLAN NOTE
Significant neutrophilic leukocytosis out of proportion to physical exam and CT findings  CT with possible colitis    She is mildly tender to palpation in the right lower quadrant and presented with poor appetite  She has not had diarrhea and no sample was available for C diff for stool studies  COVID flu and RSV negative  ID evaluation and recommendations reviewed  Currently being observed off antibiotics

## 2022-12-01 NOTE — DISCHARGE SUMMARY
[address] 48  Discharge- Bernardo Brice 11/27/1932, 80 y o  female MRN: [id number]  Unit/Bed#: E4 -01 Encounter: [id number]  Primary Care Provider: Saima England MD   Date and time admitted to hospital: 11/28/2022  1:34 PM    * Acute combined systolic and diastolic congestive heart failure (HCC)  Assessment & Plan  · Echocardiogram with EF of 43% and diastolic dysfunction  · Started on metoprolol 25 p o  B i d    · Lasix given earlier    LV (left ventricular) mural thrombus  Assessment & Plan  · In the setting of congestive heart failure with low EF and diffuse wall motion abnormality  · Heparin drip restarted after echocardiogram results revealed the presence of a thrombus    Elevated troponin  Assessment & Plan  · Without chest pain  · Likely type 2 non-STEMI in the setting of CHF, decreased clearance from CKD, and possible infection given significantly elevated leukocytosis  · Initiated on aspirin, metoprolol and Lipitor     Neutrophilic leukocytosis  Assessment & Plan  Significant neutrophilic leukocytosis out of proportion to physical exam and CT findings  CT with possible colitis    She is mildly tender to palpation in the right lower quadrant and presented with poor appetite  She has not had diarrhea and no sample was available for C diff for stool studies  COVID flu and RSV negative  ID evaluation and recommendations reviewed  Currently being observed off antibiotics      HTN (hypertension)  Assessment & Plan  HCTZ discontinued  Started on metoprolol 25 q 12    Hypokalemia  Assessment & Plan  Treated with IV and oral potassium supplementation    DM2 (diabetes mellitus, type 2) (HCC)  Assessment & Plan  Lab Results   Component Value Date    HGBA1C 6 4 (H) 08/28/2020     DM2 diet controlled prior to admission  With stress-induced hyperglycemia    CKD (chronic kidney disease)  Assessment & Plan  Lab Results   Component Value Date    EGFR 24 12/01/2022    EGFR 27 11/30/2022 EGFR 25 11/29/2022    CREATININE 1 82 (H) 12/01/2022    CREATININE 1 62 (H) 11/30/2022    CREATININE 1 71 (H) 11/29/2022     Discharging Physician / Practitioner: Iva Dennison MD  PCP: Nader Lane MD  Admission Date:   Admission Orders (From admission, onward)     Ordered        11/29/22 1028  Inpatient Admission  Once            11/28/22 1613  Place in Observation  Once                      Discharge Date: 12/01/22    Medical Problems     Resolved Problems  Date Reviewed: 12/1/2022   None         Consultations During Hospital Stay:  · Cardiology  · Geriatric Medicine  · Infectious disease    Procedures Performed:   · None    Significant Findings / Test Results:   CT abdomen pelvis wo contrast  Result Date: 11/29/2022  Impression: 1  No evidence of hydronephrosis  2   Fluid and gaseous distention of the colon and rectum suggestive of an ileus  Mild nonspecific fat stranding adjacent to the ascending colon/cecum  Cannot exclude an infectious or inflammatory process  XR chest 1 view portable  Result Date: 11/28/2022  Impression: Suspect small bilateral pleural effusions with bibasilar atelectasis  Echo complete w/ contrast if indicated  Result Date: 11/30/2022  Narrative: •  Left Ventricle: Left ventricular cavity size is normal  Wall thickness is mildly increased  The left ventricular ejection fraction is 21% by biplane measurement  Systolic function is severely reduced  There is severe global hypokinesis involving the mid and apical segments with mild hypokinesis of the basal segments  Diastolic function was present however unable to grade due to severe mitral annular calcification  There is probably a small, mural, echogenic, fixed thrombus at the apex seen with definity  •  The following segments are akinetic: apical anterior, apical septal, apical inferior, apical lateral and apex   •  The following segments are hypokinetic: basal anterior, basal anteroseptal, basal inferoseptal, basal inferior, basal inferolateral, basal anterolateral, mid anterior, mid anteroseptal, mid inferoseptal, mid inferior, mid inferolateral and mid anterolateral  •  Left Atrium: The atrium is mildly dilated  •  Mitral Valve: There is moderate calcification  There is moderately reduced mobility of the anterior leaflet and posterior leaflet  There is severe annular calcification  There is mild to moderate regurgitation  •  Tricuspid Valve: There is mild regurgitation  The estimated right ventricular systolic pressure is 49 65 mmHg  Incidental Findings:   · See above     Reason for Admission:  Poor appetite    Hospital Course: Irina Phelan is a 80 y o  female patient who originally presented to the hospital on 2022 due to ongoing poor appetite while at the [facility]  On admission she was found to have elevated troponin, elevated BNP and neutrophilic leukocytosis  She underwent further workup which revealed new acute systolic and diastolic congestive heart failure with an EF of 21%  Although she was initially asymptomatic with no chest pain and shortness of breath until earlier today  During rounds, she complained of shortness of breath and feeling warm  She had rales on exam so Lasix was given for fluid overload  Shortly after, she complain of urged to have a bowel movement  She was helped to the commode  She had a large bowel movement and after that she turn for the worse  She became unresponsive, hypotensive and went into PEA  Code blue was called and ACLS protocol was administered  This was stopped due to futility of care when her nephew Anay Plummer confirmed that she did not want to be intubated  She  immediately at 10:52 a m  She was evaluated by ID during her stay for significant neutrophilic leukocytosis which was out of proportion to her physical exam and CT scan findings    She was initially on ceftriaxone and Flagyl which was eventually discontinued  Please see above list of diagnoses and related plan for additional information  Discussion with Family:  Spoke with and updated nephew Waldo Dawn           ** Please Note: This note has been constructed using a voice recognition system **

## 2022-12-01 NOTE — PROGRESS NOTES
Progress Note - Geriatric Medicine   Myra Brooke 80 y o  female MRN: [id number]  Unit/Bed#: E4 -01 Encounter: [id number]      Assessment/Plan:    After patient seen and assessed a rapid response and code blue were called  Please see rapid/code note for additional details       Cognitive Screening  · Patient has documented history of mild cognitive impairment   · Was evaluated by geriatrics at [facility] on 2/5/2022 and scored a 29/30 on her MMSE at that time  · No further cognitive testing noted on chart review   · No TSH or vitamin B12 levels noted in epic   · Would recommend checking these levels   · No imaging of the head noted in epic   · No MOCA noted in epic   · Would recommend OT complete MOCA to obtain new baseline   · Patient is alert and oriented to person and place at baseline   · Is currently oriented to person and place   · Staff notes patient was sundowning again last night  · Was noted to be yelling out by staff   · Staff noted that she improved a bit after being put back to bed   · Was not verbally or physically aggressive   · Will start Seroquel 12 5 mg and schedule it for 1600 as patient begins to sundown at 1700  · Patient is pleasant, calm, and cooperative on exam today   · Keep mentally, physically, and socially active     Delirium   • Current mentation: alert and oriented to person and place   • Patient is at high risk secondary to age, history of mild cognitive impairment, and hospitalization   • Maintain delirium precautions   · Provide redirection, reorientation, and distraction techniques  · Maintain fall and safety precautions   · Assist with ADLs/IADLs  · Avoid deliriogenic medications such as tramadol, benzodiazepines, anticholinergics, benadryl  · Treat pain using geriatric pain protocol   · Encourage oral hydration and nutrition   · Monitor for constipation and urinary retention   · Implement sleep hygiene and limit night time interuptions   · Maintain sleep-wake cycle   · Encourage early and frequent mobilization   · Encourage participation in group activities  • Most recent EKG on 11/28/2022 revealed a QTc interval of 432  • Recommend using Zyprexa 2 5 mg IM for acute behaviors  • Would avoid benzodiazepines such as Ativan for acute behaviors as it can worsen delirium     Deconditioning   • Patient is at increased risk for deconditioning secondary to MI, weakness, gait dysfunction, and hospitalization   • Continue to optimize diet, hydration, and mobility for healing   · GFR 24 on labs today   · Avoid nephrotoxic medication and renal dose medication as needed  · Keep hydrated   · PO intake   · Patient with poor oral intake prior to admission   · No meal completions charted in epic   · Nursing notes patient has not been eating much  · Encourage oral hydration and nutrition   · Nutrition consult placed   · Diabetes   · Not currently on any blood sugar checks  · Encourage diabetic diet   · Infection   · Patient with elevated WBC count of 28 49 on labs today   · COVID, flu, and RSV negative  · Blood cultures, C diff, and UA pending   · Empirically started on antibiotics today   · ID consulted by primary team   • Monitor for signs and symptoms of infection, dehydration, DVT, and skin breakdown    Frailty   Clinical Frail Scale: 6- Moderately Frail  · Need help with all outside activities  · Need help with stairs and bathing  · May need assistance with dressing  • Most recent albumin on 11/28/2022 noted to be 2 8  • Patient with poor appetite   • Nutrition services consulted   • Encourage protein supplementation     Ambulatory Dysfunction/Falls  • Facility noted one fall prior to admission  • Ambulates with independently with a roller walker at baseline   • PT/OT consulted   • Maintain fall and safety precautions   • Encourage adequate oral hydration and nutrition   • Out of bed as tolerated     Impaired Vision   • Patient does have vision difficulties  · Wears eyeglasses at baseline   • Recommend use of corrective lenses at all appropriate times  • Encourage adequate lighting and encourage use of assistance with ambulation  • Keep personal belongings close to avoid reaching  • Encourage appropriate footwear at all times  • Recommend large font for printed materials provided to patient    Impaired Hearing   • Patient does have impaired hearing   · Does not have hearing aids   · Does use hearing amplifier, however, this is not presently on for the exam as the patient was just transferred to the commode   · Hearing impairment strongly correlated with depression, cognitive impairment, delirium and falls in the older adult  · Use sound amplifier  · Speak face to face  · Use clear dictation and enunciation of words    Dentition/Appetite   • Patient noted to have poor appetite prior to admission   • Has not been eating well here   • Imaging of the abdomen revealed colitis   • Nutrition services consulted   • Ensure meal consistency is appropriate for all abilities   • Continue aspiration precautions     Elimination   • Patient is incontinent of bowel and bladder at baseline  · Is continent at times  • Patient denies voiding difficulties   • Last documented bowel movement today  • Current bowel regimen includes   · Colace 100 mg BID   · Senna 8 6 mg BID   • Monitor for constipation and urinary retention     Insomnia   • Patient notes no difficulty sleeping   • First line is behavioral therapy   • Avoid sedative hypnotics including benzodiazepines and benadryl  • Encourage staying awake during the day   • Encourage daytime activities and morning exercise   • Decrease or eliminate daytime naps   • Avoid caffeine especially during late afternoon and evening hours  • Establish a nighttime routine  • Implement sleep hygiene and limit nighttime interruptions  • Can consider melatonin 3 mg daily at bedtime for sleep if needed     Elevated Troponin  · Patient with elevated troponins on admission without chest pain · Etiology unclear   · Cardiology following     Elevated proBNP  · Patient with BNP of 19,814 on admission yesterday   · Cardiology consulted and suspected diastolic HF   · Patient receiving Lasix   · Cardiology following and managing    Neutrophilic Leukocytosis  · Patient with elevated white count of 31 83 on labs yesterday  · COVID, flu, and RSV negative   · Blood cultures, C diff, and UA pending   · Was started on antibiotics yesterday, however, these were discontinued   · Leukocytes today slightly improved to 28 49  · ID consulted     Subjective: The patient is being seen and evaluated today at the bedside for geriatric follow-up  She is noted to be sitting on the commode as she was having a bowel movement when standing up  She is alert and oriented to person and place on exam  She offers no acute complaints on exam  She is noted to be severely hard of hearing and is not currently using the hearing amplifier  She denies dizziness, headaches, and vision changes  She denies chest pain or shortness of breath  She denies nausea and vomiting  She is noted to have diarrhea  She has not been eating well  She notes that she slept well last night and notes no sleeping difficulties  Review of Systems   Constitutional: Positive for activity change and appetite change  Negative for chills, fatigue and fever  HENT: Positive for hearing loss (uses hearing amplifier)  Eyes: Positive for visual disturbance (glasses)  Respiratory: Negative for cough, chest tightness and shortness of breath  Cardiovascular: Negative for chest pain and leg swelling  Gastrointestinal: Negative for abdominal distention, abdominal pain, constipation, diarrhea, nausea and vomiting  Genitourinary: Negative for difficulty urinating and dysuria  Musculoskeletal: Positive for gait problem  Negative for arthralgias and myalgias  Skin: Negative for color change and pallor  Neurological: Positive for weakness   Negative for dizziness and headaches  Psychiatric/Behavioral: Positive for confusion  Negative for behavioral problems and sleep disturbance  The patient is not nervous/anxious  Objective:     Vitals: Blood pressure 147/60, pulse 98, temperature (!) 97 3 °F (36 3 °C), temperature source Temporal, resp  rate 20, height 5' 2" (1 575 m), weight 82 1 kg (181 lb), SpO2 94 %  ,Body mass index is 33 11 kg/m²  No intake or output data in the 24 hours ending 12/01/22 1042    Current Medications: Reviewed    Physical Exam:   Physical Exam  Vitals reviewed  Constitutional:       Appearance: Normal appearance  HENT:      Head: Normocephalic  Mouth/Throat:      Mouth: Mucous membranes are dry  Eyes:      General: No scleral icterus  Conjunctiva/sclera: Conjunctivae normal    Cardiovascular:      Rate and Rhythm: Normal rate and regular rhythm  Heart sounds: No murmur heard  Pulmonary:      Effort: Pulmonary effort is normal  No respiratory distress  Breath sounds: Rales present  Abdominal:      General: Bowel sounds are normal  There is no distension  Palpations: Abdomen is soft  Tenderness: There is no abdominal tenderness  Musculoskeletal:         General: No swelling or tenderness  Skin:     General: Skin is warm and dry  Neurological:      General: No focal deficit present  Mental Status: She is alert  Mental status is at baseline  She is disoriented  Motor: Weakness present  Gait: Gait abnormal       Comments: Oriented to person and place          Invasive Devices     Peripheral Intravenous Line  Duration           Peripheral IV 11/28/22 Left Antecubital 2 days    Peripheral IV 11/29/22 Dorsal (posterior); Left Forearm 1 day                Lab, Imaging and other studies: I have personally reviewed pertinent reports

## 2022-12-01 NOTE — ASSESSMENT & PLAN NOTE
· Without chest pain  · Likely type 2 non-STEMI in the setting of CHF, decreased clearance from CKD, and possible infection given significantly elevated leukocytosis  · Initiated on aspirin, metoprolol and Lipitor

## 2022-12-01 NOTE — ASSESSMENT & PLAN NOTE
· Echocardiogram with EF of 78% and diastolic dysfunction  · She is short of breath today with crackles in the right base greater than left  · Will hold fluid and give stat Lasix 40 mg IV  · Continue metoprolol 25 p o  B i d

## 2022-12-01 NOTE — ASSESSMENT & PLAN NOTE
Lab Results   Component Value Date    EGFR 24 12/01/2022    EGFR 27 11/30/2022    EGFR 25 11/29/2022    CREATININE 1 82 (H) 12/01/2022    CREATININE 1 62 (H) 11/30/2022    CREATININE 1 71 (H) 11/29/2022

## 2022-12-01 NOTE — ASSESSMENT & PLAN NOTE
· Without chest pain  · Likely type 2 non-STEMI in the setting of CHF, decreased clearance from CKD, and possible infection given significantly elevated leukocytosis  · Cardiology evaluation and recommendations reviewed  · Continue aspirin, metoprolol and Lipitor

## 2022-12-01 NOTE — ASSESSMENT & PLAN NOTE
· In the setting of congestive heart failure with low EF and diffuse wall motion abnormality  · Heparin drip restarted after echocardiogram results revealed the presence of a thrombus

## 2022-12-01 NOTE — ASSESSMENT & PLAN NOTE
Lab Results   Component Value Date    HGBA1C 6 4 (H) 08/28/2020     DM2 diet controlled prior to admission  With stress-induced hyperglycemia  Increase diet today and observe for worsening pain

## 2022-12-01 NOTE — PROGRESS NOTES
[facility]  Progress Note - Trenda Notch 11/27/1932, 80 y o  female MRN: [id number]  Unit/Bed#: E4 -01 Encounter: [id number]  Primary Care Provider: Franco Cooper MD   Date and time admitted to hospital: 11/28/2022  1:34 PM    * Elevated troponin  Assessment & Plan  · Without chest pain  · Likely type 2 non-STEMI in the setting of CHF, decreased clearance from CKD, and possible infection given significantly elevated leukocytosis  · Cardiology evaluation and recommendations reviewed  · Continue aspirin, metoprolol and Lipitor     Acute combined systolic and diastolic congestive heart failure ([address] )  Assessment & Plan  · Echocardiogram with EF of 97% and diastolic dysfunction  · She is short of breath today with crackles in the right base greater than left  · Will hold fluid and give stat Lasix 40 mg IV  · Continue metoprolol 25 p o  B i d  Neutrophilic leukocytosis  Assessment & Plan  Significant neutrophilic leukocytosis out of proportion to physical exam and CT findings  CT with possible colitis    She is mildly tender to palpation in the right lower quadrant and presented with poor appetite  She has not had diarrhea and no sample was available for C diff for stool studies  COVID flu and RSV negative  ID evaluation and recommendations reviewed  Currently being observed off antibiotics      HTN (hypertension)  Assessment & Plan  Hold HCTZ  Continue metoprolol 25 q 12    Hypokalemia  Assessment & Plan  Continue oral potassium supplementation    DM2 (diabetes mellitus, type 2) (HCC)  Assessment & Plan  Lab Results   Component Value Date    HGBA1C 6 4 (H) 08/28/2020     DM2 diet controlled prior to admission  With stress-induced hyperglycemia  Increase diet today and observe for worsening pain      VTE Pharmacologic Prophylaxis:   Pharmacologic: Heparin Drip  Mechanical VTE Prophylaxis in Place: No    Patient Centered Rounds: I have performed bedside rounds with nursing staff today     Discussions with Specialists or Other Care Team Provider:  Discussed with cardiology    Education and Discussions with Family / Patient:  Discussed with nephew Ramy Ha in detail  Time Spent for Care: 30 minutes  More than 50% of total time spent on counseling and coordination of care as described above  Current Length of Stay: 2 day(s)    Current Patient Status: Inpatient   Certification Statement: The patient will continue to require additional inpatient hospital stay due to CHF, LV thrombus, leukocytosis    Discharge Plan: To be determined    Code Status: Level 1 - Full Code      Subjective:   + feels hot  + short of breath  Denies pain but clearly winces in pain during right lower quadrant palpation    Objective:     Vitals:   Temp (24hrs), Av 7 °F (36 5 °C), Min:97 3 °F (36 3 °C), Max:98 3 °F (36 8 °C)    Temp:  [97 3 °F (36 3 °C)-98 3 °F (36 8 °C)] 97 3 °F (36 3 °C)  HR:  [78-98] 98  Resp:  [20-22] 20  BP: (138-162)/(60-76) 147/60  SpO2:  [94 %-96 %] 94 %  Body mass index is 33 11 kg/m²  Input and Output Summary (last 24 hours):     No intake or output data in the 24 hours ending 22 1028    Physical Exam:     Physical Exam  Vitals reviewed  Constitutional:       Appearance: She is obese  She is not ill-appearing or diaphoretic  HENT:      Head: Normocephalic and atraumatic  Ears:      Comments: Poor hearing     Nose: No congestion or rhinorrhea  Eyes:      General: No scleral icterus  Cardiovascular:      Rate and Rhythm: Regular rhythm  Pulmonary:      Comments: Bilateral crackles right base greater than left  Abdominal:      General: Abdomen is flat  Palpations: Abdomen is soft  Musculoskeletal:      Cervical back: Neck supple  Right lower leg: No edema  Left lower leg: No edema  Skin:     Findings: Bruising present  Neurological:      Mental Status: She is disoriented     Psychiatric:         Mood and Affect: Mood normal          Behavior: Behavior normal        Additional Data:     Labs:    Results from last 7 days   Lab Units 12/01/22  0632 11/28/22  2259 11/28/22  1401   WBC Thousand/uL 28 49*   < > 28 89*   HEMOGLOBIN g/dL 13 6   < > 14 5   HEMATOCRIT % 39 2   < > 43 2   PLATELETS Thousands/uL 233   < > 218   BANDS PCT % 23*   < >  --    NEUTROS PCT %  --   --  90*   LYMPHS PCT %  --   --  2*   LYMPHO PCT % 4*   < >  --    MONOS PCT %  --   --  7   MONO PCT % 4   < >  --    EOS PCT % 0   < > 0    < > = values in this interval not displayed  Results from last 7 days   Lab Units 12/01/22  0632 11/29/22  0552 11/28/22  1401   SODIUM mmol/L 142   < > 134*   POTASSIUM mmol/L 3 1*   < > 3 0*   CHLORIDE mmol/L 108   < > 97   CO2 mmol/L 20*   < > 24   BUN mg/dL 58*   < > 61*   CREATININE mg/dL 1 82*   < > 1 99*   ANION GAP mmol/L 14*   < > 13   CALCIUM mg/dL 8 0*   < > 8 8   ALBUMIN g/dL  --   --  2 8*   TOTAL BILIRUBIN mg/dL  --   --  1 49*   ALK PHOS U/L  --   --  100   ALT U/L  --   --  31   AST U/L  --   --  53*   GLUCOSE RANDOM mg/dL 260*   < > 313*    < > = values in this interval not displayed  Results from last 7 days   Lab Units 11/30/22  1845   INR  1 48*                       * I Have Reviewed All Lab Data Listed Above  * Additional Pertinent Lab Tests Reviewed: All Labs Within Last 24 Hours Reviewed    Imaging:    Imaging Reports Reviewed Today Include:  Echocardiogram      Recent Cultures (last 7 days):     Results from last 7 days   Lab Units 11/30/22  1149   BLOOD CULTURE  Received in Microbiology Lab  Culture in Progress  Received in Microbiology Lab  Culture in Progress         Last 24 Hours Medication List:   Current Facility-Administered Medications   Medication Dose Route Frequency Provider Last Rate   • aspirin  81 mg Oral Daily Leonid Prohtel, DO     • atorvastatin  40 mg Oral Daily [employer] S Prechtel, DO     • ezetimibe  10 mg Oral Daily Leonid GANT Prechtel, DO     • heparin (porcine)  3-20 Units/kg/hr (Order-Specific) Intravenous Titrated Sarah Israel MD 16 Units/kg/hr (12/01/22 4955)   • heparin (porcine)  2,000 Units Intravenous Q1H PRN Sarah Israel MD     • heparin (porcine)  4,000 Units Intravenous Q1H PRN Sarah Israel MD     • metoprolol tartrate  25 mg Oral Q12H [address] Viral [place] Prechtel, DO     • nitroglycerin  0 4 mg Sublingual Q5 Min PRN Viral Ballard, DO     • ondansetron  4 mg Intravenous Q6H PRN Viral Ballard, DO     • potassium chloride  40 mEq Oral BID Sarah Israel MD     • timolol  1 drop Both Eyes BID Isabella Prudent, DO          Today, Patient Was Seen By: Sarah Israel MD    ** Please Note: Dictation voice to text software may have been used in the creation of this document   **

## 2022-12-01 NOTE — NURSING NOTE
Rapid response called on patient at 1043  Patient became bradycardic heart rate in 40's on the monitor, pale, and minimally responsive to stimuli  Patient placed in bed  Code blue followed at [phone]  Patient received CPR, mechanical respirations through ambubag from respiratory therapists and epinephrine by ACLS trained ICU RN  Patient's POA called by attending physician  No intubation wanted  Patient time of death 18

## 2022-12-01 NOTE — ASSESSMENT & PLAN NOTE
Lab Results   Component Value Date    HGBA1C 6 4 (H) 08/28/2020     DM2 diet controlled prior to admission  With stress-induced hyperglycemia

## 2022-12-01 NOTE — ASSESSMENT & PLAN NOTE
· Echocardiogram with EF of 41% and diastolic dysfunction  · Started on metoprolol 25 p o  B i d    · Lasix given earlier

## 2022-12-01 NOTE — PLAN OF CARE
Problem: Potential for Falls  Goal: Patient will remain free of falls  Description: INTERVENTIONS:  - Educate patient/family on patient safety including physical limitations  - Instruct patient to call for assistance with activity   - Consult OT/PT to assist with strengthening/mobility   - Keep Call bell within reach  - Keep bed low and locked with side rails adjusted as appropriate  - Keep care items and personal belongings within reach  - Initiate and maintain comfort rounds  - Make Fall Risk Sign visible to staff  - Offer Toileting every 2 Hours, in advance of need  - Initiate/Maintain bed alarm  - Obtain necessary fall risk management equipment:   - Apply yellow socks and bracelet for high fall risk patients  - Consider moving patient to room near nurses station  Outcome: Progressing     Problem: PAIN - ADULT  Goal: Verbalizes/displays adequate comfort level or baseline comfort level  Description: Interventions:  - Encourage patient to monitor pain and request assistance  - Assess pain using appropriate pain scale  - Administer analgesics based on type and severity of pain and evaluate response  - Implement non-pharmacological measures as appropriate and evaluate response  - Consider cultural and social influences on pain and pain management  - Notify physician/advanced practitioner if interventions unsuccessful or patient reports new pain  Outcome: Progressing     Problem: INFECTION - ADULT  Goal: Absence or prevention of progression during hospitalization  Description: INTERVENTIONS:  - Assess and monitor for signs and symptoms of infection  - Monitor lab/diagnostic results  - Monitor all insertion sites, i e  indwelling lines, tubes, and drains  - Monitor endotracheal if appropriate and nasal secretions for changes in amount and color  - [place] appropriate cooling/warming therapies per order  - Administer medications as ordered  - Instruct and encourage patient and family to use good hand hygiene technique  - Identify and instruct in appropriate isolation precautions for identified infection/condition  Outcome: Progressing      Problem: CARDIOVASCULAR - ADULT  Goal: Maintains optimal cardiac output and hemodynamic stability  Description: INTERVENTIONS:  - Monitor I/O, vital signs and rhythm  - Monitor for S/S and trends of decreased cardiac output  - Administer and titrate ordered vasoactive medications to optimize hemodynamic stability  - Assess quality of pulses, skin color and temperature  - Assess for signs of decreased coronary artery perfusion  - Instruct patient to report change in severity of symptoms  Outcome: Progressing

## 2022-12-01 NOTE — CASE MANAGEMENT
Case Management Discharge Planning Note    Patient name Anthony Tyler  [facility] /E4 -* MRN [id number]  : 1932 Date 2022       Current Admission Date: 2022  Current Admission Diagnosis:Elevated troponin   Patient Active Problem List    Diagnosis Date Noted   • LV (left ventricular) mural thrombus 2022   • Ileus ([facility] [address] ) 2022   • Acute combined systolic and diastolic congestive heart failure ([facility] [address] )    • Neutrophilic leukocytosis    • Elevated troponin 2022   • DM2 (diabetes mellitus, type 2) ([facility] [address] ) 2022   • CKD (chronic kidney disease) 2022   • Hypokalemia 2022   • HTN (hypertension) 2022      LOS (days): 2  Geometric Mean LOS (GMLOS) (days): 2 40  Days to GMLOS:0 3     OBJECTIVE:  Risk of Unplanned Readmission Score: 17 08         Current admission status: Inpatient   Preferred Pharmacy:   PATIENT/FAMILY REPORTS NO PREFERRED PHARMACY  No address on file      Primary Care Provider: Shawna Klinefelter, MD    Primary Insurance: MEDICARE  Secondary Insurance:     DISCHARGE DETAILS:          Additional Comments: Pt passed away and informed [address] [facility] of death

## 2022-12-01 NOTE — RAPID RESPONSE
Rapid Response Note  Medina Sewell 80 y o  female MRN: [id number]  Unit/Bed#: E4 -01 Encounter: [id number]    Rapid Response Notification(s):   Response called date/time:  12/1/2022 10:44 AM  Response team arrival date/time:  12/1/2022 10:46 AM  Response end date/time:  12/1/2022 10:52 AM  Level of care:  Medsur  Rapid [facility]  Primary reason for rapid response call:  Acute change in heart rate    Rapid Response Intervention(s):   Airway:  Suction  Breathing:  None  Circulation:  ACLS protocol and chest compressions  Fluids administered:  None  Medications administered:  Epinephrine       Assessment/Plan:   · Death 2/2 Cardiac Arrest (PEA)       Rapid Response Outcome:   Transfer:  Other (comment) (CODE- Death at 10:52 Am)  Code Status: Level 3 (DNAR and DNI)      Family notified of transfer: yes  Family member contacted: nephew, contacted by Hospitalist     Background/Situation:   Medina Sewell is a 80 y o  female who presented for lack of appetite by her family  Rapid response was called for a non perfusing rhythm    Objective:   Vitals:    11/30/22 1923 11/30/22 2305 12/01/22 0403 12/01/22 0812   BP: 156/69 139/70 138/63 147/60   BP Location: Right arm Right arm  Right arm   Pulse: 79 85 94 98   Resp: 20 20 22 20   Temp: 97 7 °F (36 5 °C) 97 6 °F (36 4 °C) (!) 97 3 °F (36 3 °C) (!) 97 3 °F (36 3 °C)   TempSrc: Temporal Temporal Temporal Temporal   SpO2: 96% 96% 96% 94%   Weight:       Height:         CODE BLUE was called at 10:47 AM  EPI given at 10:49  Suction began at 10:49  Patient actively vomiting coffee ground emesis at 10:50  Pulse check 10:51- still no pulse, in PEA  2nd Epi given at 10:52  Hospitalist confirmed patient's family wanted DNR/DNI  CODE ended, Time of Death 10:52 AM    Portions of the record may have been created with voice recognition software    Occasional wrong word or "sound a like" substitutions may have occurred due to the inherent limitations of voice recognition software  Read the chart carefully and recognize, using context, where substitutions have occurred      Kalyan Reagan, DO

## 2022-12-02 LAB — APTT PPP: 35 SECONDS (ref 23–37)

## 2022-12-04 LAB
BACTERIA BLD CULT: NORMAL
BACTERIA BLD CULT: NORMAL

## 2022-12-05 LAB
BACTERIA BLD CULT: NORMAL
BACTERIA BLD CULT: NORMAL

## 2022-12-11 NOTE — DEATH NOTE
INPATIENT DEATH NOTE  Abida Parry 80 y o  female MRN: [id number]  Unit/Bed#: E4 -01 Encounter: [id number]    Date, Time and Cause of Death    Date of Death: 22  Time of Death:  10:52 AM  Preliminary Cause of Death: Cardiac arrest          Patient's Information  Date of Death: 22  Time of Death: 1052  Pronounced by: DAXA Ho  Did the patient's death occur in the ED?: No  Did the patient's death occur in the OR?: No  Did the patient's death occur less than 10 days post-op?: No  Did the patient's death occur within 24 hours of admission?: No  Was code status DNR at the time of death?: No    PHYSICAL EXAM:  Unresponsive to noxious stimuli, Spontaneous respirations absent, Breath sounds absent and Heart sounds absent     Medical Examiner notification criteria:  NONE APPLICABLE   Medical Examiner's office notified?:  Yes   Medical Examiner accepted case?:  No    Family Notification  Was the family notified?: Yes  Date Notified: 22  Time Notified: 18  Notified by: Dr Shaheed Crowder  Name of Family Notified of Death: Radha Pardo   Relationship to Patient: Other (Comment) (nephew)  Family Notification Route: Telephone  Was the family told to contact a  home?: No  Name of  Home[de-identified] [address]    Autopsy Options:  Post-mortem examination declined by next of kin    Primary Service Attending Physician notified?:  yes - Attending:  No att  providers found
Holding RN to OR RN